# Patient Record
Sex: MALE | Race: WHITE | NOT HISPANIC OR LATINO | Employment: OTHER | URBAN - METROPOLITAN AREA
[De-identification: names, ages, dates, MRNs, and addresses within clinical notes are randomized per-mention and may not be internally consistent; named-entity substitution may affect disease eponyms.]

---

## 2021-05-03 ENCOUNTER — TELEPHONE (OUTPATIENT)
Dept: SCHEDULING | Facility: IMAGING CENTER | Age: 61
End: 2021-05-03

## 2021-05-26 ENCOUNTER — TELEPHONE (OUTPATIENT)
Dept: MEDICAL GROUP | Facility: LAB | Age: 61
End: 2021-05-26

## 2021-05-26 NOTE — TELEPHONE ENCOUNTER
NEW PATIENT VISIT PRE-VISIT PLANNING    1.  Epic[employer] Patient is checked in Patient Demographics?Yes    2.  Immunizations were updated in Epic using Reconcile Outside Information activity? Yes         3.  Is this appointment scheduled as a Hospital Follow-Up? No    4.  Patient is due for the following Health Maintenance Topics:   Health Maintenance Due   Topic Date Due   • HEPATITIS C SCREENING  Never done   • IMM DTaP/Tdap/Td Vaccine (1 - Tdap) Never done   • COLONOSCOPY  Never done   • IMM ZOSTER VACCINES (1 of 2) Never done   5.  Reviewed/Updated the following with patient:       •   Preferred Pharmacy? No       •   Preferred Lab? No       •   Preferred Communication? No       •   Allergies? No       •   Medications? NO  6.  Updated Care Team?       •   DME Company (gait device, O2, CPAP, etc.) NO       •   Other Specialists (eye doctor, derm, GYN, cardiology, endo, etc): NO    7.  AHA (Puls8) form printed for Provider? N/A

## 2021-05-26 NOTE — TELEPHONE ENCOUNTER
Left message for patient to call back regarding pre-visit planning. Please transfer call to [phone]

## 2021-06-18 ENCOUNTER — TELEPHONE (OUTPATIENT)
Dept: MEDICAL GROUP | Facility: LAB | Age: 61
End: 2021-06-18

## 2021-06-18 RX ORDER — AMOXICILLIN 250 MG
CAPSULE ORAL
COMMUNITY
End: 2021-07-26

## 2021-06-18 NOTE — TELEPHONE ENCOUNTER
NEW PATIENT VISIT PRE-VISIT PLANNING    1.  Epic[employer] Patient is checked in Patient Demographics?Yes    2.  Immunizations were updated in Epic using Reconcile Outside Information activity? Yes         3.  Is this appointment scheduled as a Hospital Follow-Up? No    4.  Patient is due for the following Health Maintenance Topics:   Health Maintenance Due   Topic Date Due   • HEPATITIS C SCREENING  Never done   • COLONOSCOPY  Never done   • IMM ZOSTER VACCINES (1 of 2) Never done   • IMM DTaP/Tdap/Td Vaccine (2 - Td or Tdap) 02/02/2021     5.  Reviewed/Updated the following with patient:       •   Preferred Pharmacy? No       •   Preferred Lab? No       •   Preferred Communication? No       •   Allergies? No       •   Medications? NO      6.  Updated Care Team?       •   DME Company (gait device, O2, CPAP, etc.) NO       •   Other Specialists (eye doctor, derm, GYN, cardiology, endo, etc): NO    7.  AHA (Puls8) form printed for Provider? N/A

## 2021-06-18 NOTE — TELEPHONE ENCOUNTER
Left message for patient to call back regarding pre-visit planning. Please transfer call to [phone]

## 2021-06-25 ENCOUNTER — TELEPHONE (OUTPATIENT)
Dept: SCHEDULING | Facility: IMAGING CENTER | Age: 61
End: 2021-06-25

## 2021-07-21 ENCOUNTER — APPOINTMENT (OUTPATIENT)
Dept: RADIOLOGY | Facility: MEDICAL CENTER | Age: 61
End: 2021-07-21
Attending: EMERGENCY MEDICINE
Payer: COMMERCIAL

## 2021-07-21 ENCOUNTER — HOSPITAL ENCOUNTER (EMERGENCY)
Facility: MEDICAL CENTER | Age: 61
End: 2021-07-21
Attending: EMERGENCY MEDICINE
Payer: COMMERCIAL

## 2021-07-21 VITALS
HEIGHT: 72 IN | WEIGHT: 174.16 LBS | SYSTOLIC BLOOD PRESSURE: 133 MMHG | DIASTOLIC BLOOD PRESSURE: 88 MMHG | OXYGEN SATURATION: 100 % | BODY MASS INDEX: 23.59 KG/M2 | RESPIRATION RATE: 16 BRPM | TEMPERATURE: 97.9 F | HEART RATE: 86 BPM

## 2021-07-21 DIAGNOSIS — I82.432 ACUTE DEEP VEIN THROMBOSIS (DVT) OF POPLITEAL VEIN OF LEFT LOWER EXTREMITY (HCC): ICD-10-CM

## 2021-07-21 LAB
ANION GAP SERPL CALC-SCNC: 9 MMOL/L (ref 7–16)
APTT PPP: 32.6 SEC (ref 24.7–36)
BASOPHILS # BLD AUTO: 0.7 % (ref 0–1.8)
BASOPHILS # BLD: 0.04 K/UL (ref 0–0.12)
BUN SERPL-MCNC: 16 MG/DL (ref 8–22)
CALCIUM SERPL-MCNC: 9.3 MG/DL (ref 8.4–10.2)
CHLORIDE SERPL-SCNC: 103 MMOL/L (ref 96–112)
CO2 SERPL-SCNC: 27 MMOL/L (ref 20–33)
CREAT SERPL-MCNC: 0.73 MG/DL (ref 0.5–1.4)
EOSINOPHIL # BLD AUTO: 0.32 K/UL (ref 0–0.51)
EOSINOPHIL NFR BLD: 5.7 % (ref 0–6.9)
ERYTHROCYTE [DISTWIDTH] IN BLOOD BY AUTOMATED COUNT: 43.3 FL (ref 35.9–50)
GLUCOSE SERPL-MCNC: 91 MG/DL (ref 65–99)
HCT VFR BLD AUTO: 46 % (ref 42–52)
HGB BLD-MCNC: 14.9 G/DL (ref 14–18)
IMM GRANULOCYTES # BLD AUTO: 0.01 K/UL (ref 0–0.11)
IMM GRANULOCYTES NFR BLD AUTO: 0.2 % (ref 0–0.9)
INR PPP: 1.07 (ref 0.87–1.13)
LYMPHOCYTES # BLD AUTO: 1.62 K/UL (ref 1–4.8)
LYMPHOCYTES NFR BLD: 29.1 % (ref 22–41)
MCH RBC QN AUTO: 29.9 PG (ref 27–33)
MCHC RBC AUTO-ENTMCNC: 32.4 G/DL (ref 33.7–35.3)
MCV RBC AUTO: 92.4 FL (ref 81.4–97.8)
MONOCYTES # BLD AUTO: 0.38 K/UL (ref 0–0.85)
MONOCYTES NFR BLD AUTO: 6.8 % (ref 0–13.4)
NEUTROPHILS # BLD AUTO: 3.2 K/UL (ref 1.82–7.42)
NEUTROPHILS NFR BLD: 57.5 % (ref 44–72)
NRBC # BLD AUTO: 0 K/UL
NRBC BLD-RTO: 0 /100 WBC
PLATELET # BLD AUTO: 221 K/UL (ref 164–446)
PMV BLD AUTO: 9.2 FL (ref 9–12.9)
POTASSIUM SERPL-SCNC: 3.8 MMOL/L (ref 3.6–5.5)
PROTHROMBIN TIME: 13.1 SEC (ref 12–14.6)
RBC # BLD AUTO: 4.98 M/UL (ref 4.7–6.1)
SODIUM SERPL-SCNC: 139 MMOL/L (ref 135–145)
WBC # BLD AUTO: 5.6 K/UL (ref 4.8–10.8)

## 2021-07-21 PROCEDURE — 85025 COMPLETE CBC W/AUTO DIFF WBC: CPT

## 2021-07-21 PROCEDURE — 85610 PROTHROMBIN TIME: CPT

## 2021-07-21 PROCEDURE — 85730 THROMBOPLASTIN TIME PARTIAL: CPT

## 2021-07-21 PROCEDURE — A9270 NON-COVERED ITEM OR SERVICE: HCPCS | Performed by: EMERGENCY MEDICINE

## 2021-07-21 PROCEDURE — 700102 HCHG RX REV CODE 250 W/ 637 OVERRIDE(OP): Performed by: EMERGENCY MEDICINE

## 2021-07-21 PROCEDURE — 93971 EXTREMITY STUDY: CPT | Mod: LT

## 2021-07-21 PROCEDURE — 99283 EMERGENCY DEPT VISIT LOW MDM: CPT

## 2021-07-21 PROCEDURE — 80048 BASIC METABOLIC PNL TOTAL CA: CPT

## 2021-07-21 RX ADMIN — RIVAROXABAN 15 MG: 15 TABLET, FILM COATED ORAL at 21:36

## 2021-07-22 NOTE — ED TRIAGE NOTES
Chief Complaint   Patient presents with   • Leg Pain     pain and swelling to LLE for 2 days.      /75   Pulse (!) 52   Temp 36.4 °C (97.5 °F) (Temporal)   Resp 20   Ht 1.829 m (6')   Wt 79 kg (174 lb 2.6 oz)   SpO2 95%   BMI 23.62 kg/m²

## 2021-07-22 NOTE — ED NOTES
Patient discharged home in stable condition  AVS provided with recommended follow up and home care instructions and education information  Prescription for Xeralto provided at this time  Patient verbalized understanding  Ambulatory at time of discharge

## 2021-07-22 NOTE — ED PROVIDER NOTES
ED Provider Note    CHIEF COMPLAINT  Chief Complaint   Patient presents with   • Leg Pain     pain and swelling to LLE for 2 days.        HPI  Reilly Swartz is a 61 y.o. male who presents to the emergency department with complaint of leg pain.  The patient states that he started having left lower extremity leg pain and calf pain for the last 2 days.  There is no received alleviating factors, he denies access of sensation or strength of his lower extremity, recent trip and car plane, recent trauma, history of DVT or blood clot.  REVIEW OF SYSTEMS  Positives as above.  Pertinent negatives include loss of sensation or strength lower extremity, chest pain, shortness of breath, discoloration of left lower extremity    PAST MEDICAL HISTORY  History reviewed. No pertinent past medical history.    FAMILY HISTORY  Noncontributory    SOCIAL HISTORY  Social History     Socioeconomic History   • Marital status:      Spouse name: Not on file   • Number of children: Not on file   • Years of education: Not on file   • Highest education level: Not on file   Occupational History   • Not on file   Tobacco Use   • Smoking status: Never Smoker   • Smokeless tobacco: Never Used   Vaping Use   • Vaping Use: Never assessed   Substance and Sexual Activity   • Alcohol use: Not Currently   • Drug use: Never   • Sexual activity: Not on file   Other Topics Concern   • Not on file   Social History Narrative   • Not on file     Social Determinants of Health     Financial Resource Strain:    • Difficulty of Paying Living Expenses:    Food Insecurity:    • Worried About Running Out of Food in the Last Year:    • Ran Out of Food in the Last Year:    Transportation Needs:    • Lack of Transportation (Medical):    • Lack of Transportation (Non-Medical):    Physical Activity:    • Days of Exercise per Week:    • Minutes of Exercise per Session:    Stress:    • Feeling of Stress :    Social Connections:    • Frequency of Communication with  Friends and Family:    • Frequency of Social Gatherings with Friends and Family:    • Attends Jew Services:    • Active Member of Clubs or Organizations:    • Attends Club or Organization Meetings:    • Marital Status:    Intimate Partner Violence:    • Fear of Current or Ex-Partner:    • Emotionally Abused:    • Physically Abused:    • Sexually Abused:        SURGICAL HISTORY  Past Surgical History:   Procedure Laterality Date   • OTHER ABDOMINAL SURGERY      inguinal hernia repair 2008       CURRENT MEDICATIONS  Home Medications    **Home medications have not yet been reviewed for this encounter**         ALLERGIES  No Known Allergies    PHYSICAL EXAM  VITAL SIGNS: /88   Pulse 86   Temp 36.6 °C (97.9 °F) (Temporal)   Resp 16   Ht 1.829 m (6')   Wt 79 kg (174 lb 2.6 oz)   SpO2 100%   BMI 23.62 kg/m²      Constitutional: Well developed, Well nourished, No acute distress, Non-toxic appearance.   Eyes: PERRLA, EOMI, Conjunctiva normal, No discharge.   Skin: Warm, Dry, No erythema, No rash.   Extremities: Edema and tenderness to the posterior calf region and popliteal region, distal pulses are intact  Neurologic: Strength and sensation intact left lower extremity    Results for orders placed or performed during the hospital encounter of 07/21/21   CBC WITH DIFFERENTIAL   Result Value Ref Range    WBC 5.6 4.8 - 10.8 K/uL    RBC 4.98 4.70 - 6.10 M/uL    Hemoglobin 14.9 14.0 - 18.0 g/dL    Hematocrit 46.0 42.0 - 52.0 %    MCV 92.4 81.4 - 97.8 fL    MCH 29.9 27.0 - 33.0 pg    MCHC 32.4 (L) 33.7 - 35.3 g/dL    RDW 43.3 35.9 - 50.0 fL    Platelet Count 221 164 - 446 K/uL    MPV 9.2 9.0 - 12.9 fL    Neutrophils-Polys 57.50 44.00 - 72.00 %    Lymphocytes 29.10 22.00 - 41.00 %    Monocytes 6.80 0.00 - 13.40 %    Eosinophils 5.70 0.00 - 6.90 %    Basophils 0.70 0.00 - 1.80 %    Immature Granulocytes 0.20 0.00 - 0.90 %    Nucleated RBC 0.00 /100 WBC    Neutrophils (Absolute) 3.20 1.82 - 7.42 K/uL    Lymphs  (Absolute) 1.62 1.00 - 4.80 K/uL    Monos (Absolute) 0.38 0.00 - 0.85 K/uL    Eos (Absolute) 0.32 0.00 - 0.51 K/uL    Baso (Absolute) 0.04 0.00 - 0.12 K/uL    Immature Granulocytes (abs) 0.01 0.00 - 0.11 K/uL    NRBC (Absolute) 0.00 K/uL   BMP   Result Value Ref Range    Sodium 139 135 - 145 mmol/L    Potassium 3.8 3.6 - 5.5 mmol/L    Chloride 103 96 - 112 mmol/L    Co2 27 20 - 33 mmol/L    Glucose 91 65 - 99 mg/dL    Bun 16 8 - 22 mg/dL    Creatinine 0.73 0.50 - 1.40 mg/dL    Calcium 9.3 8.4 - 10.2 mg/dL    Anion Gap 9.0 7.0 - 16.0   PT/INR   Result Value Ref Range    PT 13.1 12.0 - 14.6 sec    INR 1.07 0.87 - 1.13   PTT   Result Value Ref Range    APTT 32.6 24.7 - 36.0 sec   ESTIMATED GFR   Result Value Ref Range    GFR If African American >60 >60 mL/min/1.73 m 2    GFR If Non African American >60 >60 mL/min/1.73 m 2         RADIOLOGY/PROCEDURES  US-EXTREMITY VENOUS LOWER UNILAT LEFT   Final Result         1.  Occlusive DVT in the popliteal vein extending distally into the calf veins.   2.  Small collection of free fluid in the soft tissues near the area of interest could represent small seroma, hematoma, or possibly early abscess.      These findings were discussed with the patient's clinician, Enrrique Vang, on 7/21/2021 8:28 PM.          COURSE & MEDICAL DECISION MAKING  Pertinent Labs & Imaging studies reviewed. (See chart for details)  This is a pleasant 61-year-old male presents with swelling of his left lower extremity.  The patient does have a DVT on ultrasound evaluation.  He has no renal insufficiency, no evidence of thrombocytopenia and his coags are normal.  The patient has no evidence of pulmonary visit with no chest pain, shortness of breath, hypoxia tachypnea.  The patient did consent for anticoagulation.  He received Xarelto here in the emergency department, he referral to the anticoagulation clinic and referral to a primary care physician as well.  The patient understands the need to return  to the emergency department for chest pain, shortness of breath or signs of pulmonary embolism.    FINAL IMPRESSION     1. Acute deep vein thrombosis (DVT) of popliteal vein of left lower extremity (HCC)      DISPOSITION:  Patient will be discharged home in stable condition.    FOLLOW UP:  [facility], Emergency Dept  [address]  [place] [id number] [phone]    If symptoms worsen    Eloise Jacob M.D.  [facility]   [address]  [place] [address]  [phone]    Schedule an appointment as soon as possible for a visit         OUTPATIENT MEDICATIONS:  Discharge Medication List as of 7/21/2021  9:32 PM      START taking these medications    Details   rivaroxaban (XARELTO) 15 MG Tab tablet Take 1 tablet by mouth 2 times a day for 21 days., Disp-42 tablet, R-0, Print Rx Paper           Electronically signed by: Enrrique Vang D.O., 7/21/2021 6:45 PM

## 2021-07-26 ENCOUNTER — ANTICOAGULATION VISIT (OUTPATIENT)
Dept: VASCULAR LAB | Facility: MEDICAL CENTER | Age: 61
End: 2021-07-26
Attending: INTERNAL MEDICINE
Payer: COMMERCIAL

## 2021-07-26 VITALS — DIASTOLIC BLOOD PRESSURE: 74 MMHG | SYSTOLIC BLOOD PRESSURE: 109 MMHG | HEART RATE: 59 BPM

## 2021-07-26 DIAGNOSIS — I82.409 DEEP VEIN THROMBOSIS (DVT) OF LOWER EXTREMITY, UNSPECIFIED CHRONICITY, UNSPECIFIED LATERALITY, UNSPECIFIED VEIN (HCC): ICD-10-CM

## 2021-07-26 PROCEDURE — 99213 OFFICE O/P EST LOW 20 MIN: CPT

## 2021-07-26 NOTE — PROGRESS NOTES
NEW DOAC   .  Anticoagulation Summary  As of 7/26/2021    INR goal:     TTR:  --   INR used for dosing:  No new INR was available at the time of this encounter.   Warfarin maintenance plan:  No maintenance plan   Next INR check:  8/10/2021   Target end date:  10/26/2021    Indications    Deep vein thrombosis (HCC) [I82.409]             Anticoagulation Episode Summary     INR check location:      Preferred lab:      Send INR reminders to:      Comments:          Anticoagulation Patient Findings  Patient Findings     Negatives:  Signs/symptoms of thrombosis, Signs/symptoms of bleeding, Laboratory test error suspected, Change in health, Change in alcohol use, Change in activity, Upcoming invasive procedure, Emergency department visit, Upcoming dental procedure, Missed doses, Extra doses, Change in medications, Change in diet/appetite, Hospital admission, Bruising, Other complaints          PCP: No primary care provider on file.  Cardiologist: n/a  Dx: DVT  CHADSVASC = n/a  HAS-BLED = 0  Target End Date = OCT 26, 2021    Pt Hx: Patient presented to the ED for LLE pain and swelling. The patient is a runner and thought maybe it was a strained muscle or injury from that. Because he was having more significant pain and swelling, he went to get checked.   He does report his father and brother may have a genetic predisposition for clotting. He cannot recall the specifics, but will ask his brother who is still alive and inform us at next visit. May need a hypercoagulable workup.     Labs:  Lab Results   Component Value Date/Time    WBC 5.6 07/21/2021 08:30 PM    RBC 4.98 07/21/2021 08:30 PM    HEMOGLOBIN 14.9 07/21/2021 08:30 PM    HEMATOCRIT 46.0 07/21/2021 08:30 PM    MCV 92.4 07/21/2021 08:30 PM    MCH 29.9 07/21/2021 08:30 PM    MCHC 32.4 (L) 07/21/2021 08:30 PM    MPV 9.2 07/21/2021 08:30 PM    NEUTSPOLYS 57.50 07/21/2021 08:30 PM    LYMPHOCYTES 29.10 07/21/2021 08:30 PM    MONOCYTES 6.80 07/21/2021 08:30 PM     EOSINOPHILS 5.70 07/21/2021 08:30 PM    BASOPHILS 0.70 07/21/2021 08:30 PM      Lab Results   Component Value Date/Time    SODIUM 139 07/21/2021 08:30 PM    POTASSIUM 3.8 07/21/2021 08:30 PM    CHLORIDE 103 07/21/2021 08:30 PM    CO2 27 07/21/2021 08:30 PM    GLUCOSE 91 07/21/2021 08:30 PM    BUN 16 07/21/2021 08:30 PM    CREATININE 0.73 07/21/2021 08:30 PM          Pt is new to Xarelto and new to [facility]. Discussed:   · Indication for DOAC therapy.  · Importance of monitoring and compliance.   · Monitoring parameters, signs and symptoms of bleeding or clotting.  · DOAC therapy, side effects, potential DDIs, OTC medications  · DDI   · Pt is NOT on antiplatelet/NSAID therapy   · Lifestyle safety, ie smoking, ETOH, hobby safety, fall safety/prevention  · Procedures for missed doses or suspected missed doses, surgeries/procedures, travel, dental work, any medication changes    Patient started with Xarelto 15mg taken 2 times a day for 21 days on 7/21/21, and will switch to 20mg taken once daily. Pt will transition to 20mg dose on 8/11/21    DOAC affordable = yes    Samples provided: no    Health Status Since Last Assessment   Patient denies any new relevant medical problems, ED visits or hospitalizations   Patient denies any embolic events (stroke/tia/systemic embolism)    Adherence with DOAC Therapy   Pt has denies missed any doses in the average week    Bleeding Risk Assessment     Denies Epistaxis   Pt denies any excessive or unusual bleeding/hematomas.  Pt denies any GI bleeds or hematemesis.  Pt denies any concerning daily headache or sub dural hematoma symptoms.     Pt denies any hematuria   Latest Hemoglobin 14.9   ETOH overuse denies - patient is recovering alcoholic     Creatinine Clearance/Renal Function     Latest ClCr= 57.6 ml/min    Results for SHIN WALTON (MRN [id number]) as of 7/26/2021 12:04   Ref. Range 7/21/2021 20:30   Creatinine Latest Ref Range: 0.50 - 1.40 mg/dL 0.73   GFR If African American  Latest Ref Range: >60 mL/min/1.73 m 2 >60   GFR If Non  Latest Ref Range: >60 mL/min/1.73 m 2 >60     Hepatic function   Latest LFTs    Pt denies any history of liver dysfunction      Drug Interactions   Platelets: 221   ASA/other antiplatelets none   NSAID none   Other drug interactions none   Verified no anticonvulsant or azole therapy, education provided for future use.     Examination   Blood Pressure 109/74   Symptomatic hypotension denies   Significant gait impairment/imbalance/high risk for falls? denies    Final Assessment and Recommendations:   Patient appears stable from the anticoagulation standpoint.     Benefits of continued DOAC therapy outweigh risks for this patient   Recommend pt continue with current DOAC therapy Xarelto 15mg BID     Follow up:   Will follow up with patient 2 weeks  Next appt: Tuesday, Aug 10, 2021   7:30am    Hellen Jolly  PharmD        CC: Dr. Michael Bloch

## 2021-07-28 ENCOUNTER — APPOINTMENT (OUTPATIENT)
Dept: RADIOLOGY | Facility: MEDICAL CENTER | Age: 61
End: 2021-07-28
Attending: EMERGENCY MEDICINE
Payer: COMMERCIAL

## 2021-07-28 ENCOUNTER — HOSPITAL ENCOUNTER (OUTPATIENT)
Facility: MEDICAL CENTER | Age: 61
End: 2021-07-31
Attending: EMERGENCY MEDICINE | Admitting: HOSPITALIST
Payer: COMMERCIAL

## 2021-07-28 ENCOUNTER — OFFICE VISIT (OUTPATIENT)
Dept: MEDICAL GROUP | Facility: LAB | Age: 61
End: 2021-07-28
Payer: COMMERCIAL

## 2021-07-28 VITALS
RESPIRATION RATE: 12 BRPM | HEART RATE: 54 BPM | SYSTOLIC BLOOD PRESSURE: 104 MMHG | BODY MASS INDEX: 23.43 KG/M2 | TEMPERATURE: 97.8 F | WEIGHT: 173 LBS | OXYGEN SATURATION: 96 % | HEIGHT: 72 IN | DIASTOLIC BLOOD PRESSURE: 70 MMHG

## 2021-07-28 DIAGNOSIS — Z12.11 SCREENING FOR COLORECTAL CANCER: ICD-10-CM

## 2021-07-28 DIAGNOSIS — Z12.12 SCREENING FOR COLORECTAL CANCER: ICD-10-CM

## 2021-07-28 DIAGNOSIS — I82.432 ACUTE DEEP VEIN THROMBOSIS (DVT) OF POPLITEAL VEIN OF LEFT LOWER EXTREMITY (HCC): ICD-10-CM

## 2021-07-28 DIAGNOSIS — Z79.01 CHRONIC ANTICOAGULATION: ICD-10-CM

## 2021-07-28 DIAGNOSIS — Z23 NEED FOR VACCINATION: ICD-10-CM

## 2021-07-28 DIAGNOSIS — Z13.6 SCREENING FOR CARDIOVASCULAR CONDITION: ICD-10-CM

## 2021-07-28 DIAGNOSIS — R10.30 LOWER ABDOMINAL PAIN: ICD-10-CM

## 2021-07-28 DIAGNOSIS — S30.1XXA HEMATOMA OF RECTUS SHEATH, INITIAL ENCOUNTER: ICD-10-CM

## 2021-07-28 DIAGNOSIS — Z72.89 OTHER PROBLEMS RELATED TO LIFESTYLE: ICD-10-CM

## 2021-07-28 DIAGNOSIS — M25.641 FINGER STIFFNESS, RIGHT: ICD-10-CM

## 2021-07-28 LAB
ALBUMIN SERPL BCP-MCNC: 4 G/DL (ref 3.2–4.9)
ALBUMIN/GLOB SERPL: 1.7 G/DL
ALP SERPL-CCNC: 68 U/L (ref 30–99)
ALT SERPL-CCNC: 22 U/L (ref 2–50)
ANION GAP SERPL CALC-SCNC: 9 MMOL/L (ref 7–16)
APPEARANCE UR: CLEAR
AST SERPL-CCNC: 21 U/L (ref 12–45)
BASOPHILS # BLD AUTO: 0.4 % (ref 0–1.8)
BASOPHILS # BLD: 0.04 K/UL (ref 0–0.12)
BILIRUB SERPL-MCNC: 0.4 MG/DL (ref 0.1–1.5)
BILIRUB UR QL STRIP.AUTO: NEGATIVE
BUN SERPL-MCNC: 17 MG/DL (ref 8–22)
CALCIUM SERPL-MCNC: 9 MG/DL (ref 8.4–10.2)
CHLORIDE SERPL-SCNC: 104 MMOL/L (ref 96–112)
CO2 SERPL-SCNC: 26 MMOL/L (ref 20–33)
COLOR UR: YELLOW
CREAT SERPL-MCNC: 0.82 MG/DL (ref 0.5–1.4)
EOSINOPHIL # BLD AUTO: 0.22 K/UL (ref 0–0.51)
EOSINOPHIL NFR BLD: 2.3 % (ref 0–6.9)
ERYTHROCYTE [DISTWIDTH] IN BLOOD BY AUTOMATED COUNT: 42.7 FL (ref 35.9–50)
GLOBULIN SER CALC-MCNC: 2.4 G/DL (ref 1.9–3.5)
GLUCOSE SERPL-MCNC: 145 MG/DL (ref 65–99)
GLUCOSE UR STRIP.AUTO-MCNC: NEGATIVE MG/DL
HCT VFR BLD AUTO: 40.3 % (ref 42–52)
HGB BLD-MCNC: 13.5 G/DL (ref 14–18)
IMM GRANULOCYTES # BLD AUTO: 0.05 K/UL (ref 0–0.11)
IMM GRANULOCYTES NFR BLD AUTO: 0.5 % (ref 0–0.9)
KETONES UR STRIP.AUTO-MCNC: NEGATIVE MG/DL
LEUKOCYTE ESTERASE UR QL STRIP.AUTO: NEGATIVE
LIPASE SERPL-CCNC: 27 U/L (ref 7–58)
LYMPHOCYTES # BLD AUTO: 0.98 K/UL (ref 1–4.8)
LYMPHOCYTES NFR BLD: 10 % (ref 22–41)
MCH RBC QN AUTO: 30.3 PG (ref 27–33)
MCHC RBC AUTO-ENTMCNC: 33.5 G/DL (ref 33.7–35.3)
MCV RBC AUTO: 90.4 FL (ref 81.4–97.8)
MICRO URNS: NORMAL
MONOCYTES # BLD AUTO: 0.67 K/UL (ref 0–0.85)
MONOCYTES NFR BLD AUTO: 6.9 % (ref 0–13.4)
NEUTROPHILS # BLD AUTO: 7.81 K/UL (ref 1.82–7.42)
NEUTROPHILS NFR BLD: 79.9 % (ref 44–72)
NITRITE UR QL STRIP.AUTO: NEGATIVE
NRBC # BLD AUTO: 0 K/UL
NRBC BLD-RTO: 0 /100 WBC
PH UR STRIP.AUTO: 7 [PH] (ref 5–8)
PLATELET # BLD AUTO: 276 K/UL (ref 164–446)
PMV BLD AUTO: 8.9 FL (ref 9–12.9)
POTASSIUM SERPL-SCNC: 3.7 MMOL/L (ref 3.6–5.5)
PROT SERPL-MCNC: 6.4 G/DL (ref 6–8.2)
PROT UR QL STRIP: NEGATIVE MG/DL
RBC # BLD AUTO: 4.46 M/UL (ref 4.7–6.1)
RBC UR QL AUTO: NEGATIVE
SODIUM SERPL-SCNC: 139 MMOL/L (ref 135–145)
SP GR UR STRIP.AUTO: 1.01
WBC # BLD AUTO: 9.8 K/UL (ref 4.8–10.8)

## 2021-07-28 PROCEDURE — 74177 CT ABD & PELVIS W/CONTRAST: CPT

## 2021-07-28 PROCEDURE — 90715 TDAP VACCINE 7 YRS/> IM: CPT | Performed by: FAMILY MEDICINE

## 2021-07-28 PROCEDURE — 85025 COMPLETE CBC W/AUTO DIFF WBC: CPT

## 2021-07-28 PROCEDURE — 700117 HCHG RX CONTRAST REV CODE 255: Performed by: EMERGENCY MEDICINE

## 2021-07-28 PROCEDURE — 99204 OFFICE O/P NEW MOD 45 MIN: CPT | Mod: 25 | Performed by: FAMILY MEDICINE

## 2021-07-28 PROCEDURE — 99285 EMERGENCY DEPT VISIT HI MDM: CPT

## 2021-07-28 PROCEDURE — 90472 IMMUNIZATION ADMIN EACH ADD: CPT | Performed by: FAMILY MEDICINE

## 2021-07-28 PROCEDURE — 90750 HZV VACC RECOMBINANT IM: CPT | Performed by: FAMILY MEDICINE

## 2021-07-28 PROCEDURE — 90471 IMMUNIZATION ADMIN: CPT | Performed by: FAMILY MEDICINE

## 2021-07-28 PROCEDURE — 85730 THROMBOPLASTIN TIME PARTIAL: CPT

## 2021-07-28 PROCEDURE — 80053 COMPREHEN METABOLIC PANEL: CPT

## 2021-07-28 PROCEDURE — 81003 URINALYSIS AUTO W/O SCOPE: CPT

## 2021-07-28 PROCEDURE — 83690 ASSAY OF LIPASE: CPT

## 2021-07-28 PROCEDURE — 85610 PROTHROMBIN TIME: CPT

## 2021-07-28 RX ADMIN — IOHEXOL 100 ML: 350 INJECTION, SOLUTION INTRAVENOUS at 23:47

## 2021-07-28 ASSESSMENT — ENCOUNTER SYMPTOMS
DIARRHEA: 0
NAUSEA: 0
FEVER: 0
VOMITING: 0
CHILLS: 0
SHORTNESS OF BREATH: 0

## 2021-07-28 ASSESSMENT — LIFESTYLE VARIABLES
ON A TYPICAL DAY WHEN YOU DRINK ALCOHOL HOW MANY DRINKS DO YOU HAVE: 0
DO YOU DRINK ALCOHOL: NO
EVER HAD A DRINK FIRST THING IN THE MORNING TO STEADY YOUR NERVES TO GET RID OF A HANGOVER: NO
CONSUMPTION TOTAL: NEGATIVE
TOTAL SCORE: 0
HOW MANY TIMES IN THE PAST YEAR HAVE YOU HAD 5 OR MORE DRINKS IN A DAY: 0
HAVE PEOPLE ANNOYED YOU BY CRITICIZING YOUR DRINKING: NO
TOTAL SCORE: 0
AVERAGE NUMBER OF DAYS PER WEEK YOU HAVE A DRINK CONTAINING ALCOHOL: 0
HAVE YOU EVER FELT YOU SHOULD CUT DOWN ON YOUR DRINKING: NO
EVER FELT BAD OR GUILTY ABOUT YOUR DRINKING: NO
TOTAL SCORE: 0

## 2021-07-28 ASSESSMENT — PATIENT HEALTH QUESTIONNAIRE - PHQ9: CLINICAL INTERPRETATION OF PHQ2 SCORE: 0

## 2021-07-28 NOTE — PROGRESS NOTES
Reilly Swartz is a 61 y.o. male here to establish care and discuss DVT and finger stiffness.    HPI:      DVT  Left popliteal DVT on ER visit 1 week ago.  He was started on Xarelto.  No obvious provoking factor.  He does have appointment with vascular medicine next month.  He does report a possible hereditary hypercoagulability in his dad and brother.  No prior incidence of DVT.  Swelling has improved, no chest pain or shortness of breath.    Fingers  Limited flexion or stiffness in thumbs and other fingers over last few months.  Worst in right thumb.  He notices when using phone or keyboard.  He is stiff all day, necessarily worse in the morning.  No pain or swelling.  No injury.      Recovering alcoholic - does AA  Current medicines (including changes today)  Current Outpatient Medications   Medication Sig Dispense Refill   • rivaroxaban (XARELTO) 15 MG Tab tablet Take 1 tablet by mouth 2 times a day for 21 days. 42 tablet 0     No current facility-administered medications for this visit.     He  has no past medical history on file.  He  has a past surgical history that includes other abdominal surgery.  Social History     Tobacco Use   • Smoking status: Never Smoker   • Smokeless tobacco: Never Used   Vaping Use   • Vaping Use: Never used   Substance Use Topics   • Alcohol use: Not Currently   • Drug use: Never     Social History     Social History Narrative   • Not on file     History reviewed. No pertinent family history.  No family status information on file.       ROS  Review of Systems   Constitutional: Negative for chills and fever.   Respiratory: Negative for shortness of breath.    Cardiovascular: Negative for chest pain.   Gastrointestinal: Negative for diarrhea, nausea and vomiting.         Objective:     Physical Exam:  /70 (BP Location: Right arm, Patient Position: Sitting, BP Cuff Size: Adult)   Pulse (!) 54   Temp 36.6 °C (97.8 °F)   Resp 12   Ht 1.829 m (6')   Wt 78.5 kg (173 lb)    SpO2 96%  Body mass index is 23.46 kg/m².  Constitutional: Alert. Well appearing. No distress.  Skin: Warm, dry, good turgor, no visible rashes.  Eye: Equal, round and reactive to light, conjunctiva clear, lids normal.  Neck: Trachea midline, no masses, no thyromegaly. No cervical or supraclavicular lymphadenopathy.  Respiratory: Normal effort. Lungs are clear to auscultation bilaterally.  Cardiovascular: Regular rate and rhythm. Normal S1/S2. No murmurs, rubs or gallops.   Ext: No lower extremity edema, tenderness, or erythema.  MSK: Bony swelling to the IP joint of the right thumb.  No tenderness or synovitis noted.  Range of motion is intact all the fingers of both hands.  Neuro: Moves all four extremities. No facial droop.  Psych: Answers questions appropriately. Normal affect and mood.    Assessment and Plan:     1. Acute deep vein thrombosis (DVT) of popliteal vein of left lower extremity (HCC)  Diagnosed at ER visit 1 week ago.  No prior DVT, appears unprovoked.  He may have family history of hereditary hypercoagulability.  Symptoms improving.  Has follow-up with vascular medicine clinic next month.  Continue Xarelto.    2. Finger stiffness, right  Stiffness to right thumb with bony swelling.  Likely OA, x-rays ordered.  - DX-FINGER(S) 2+ RIGHT; Future    3. Other problems related to lifestyle  - HEP C VIRUS ANTIBODY; Future    4. Screening for cardiovascular condition  - Lipid Profile; Future    5. Need for vaccination  - Tdap Vaccine =>8YO IM  - Shingles Vaccine (Shingrix)    6. Screening for colorectal cancer  No prior colonoscopy.  - REFERRAL TO GI FOR COLONOSCOPY    Records requested from previous PCP.    Follow up: Return in about 3 months (around 10/28/2021).         PLEASE NOTE: This note was created using voice recognition software.

## 2021-07-29 ENCOUNTER — TELEPHONE (OUTPATIENT)
Dept: MEDICAL GROUP | Facility: LAB | Age: 61
End: 2021-07-29

## 2021-07-29 PROBLEM — Z79.01 CHRONIC ANTICOAGULATION: Status: ACTIVE | Noted: 2021-07-29

## 2021-07-29 PROBLEM — R73.09 ELEVATED GLUCOSE: Status: ACTIVE | Noted: 2021-07-29

## 2021-07-29 PROBLEM — S30.1XXA RECTUS SHEATH HEMATOMA: Status: ACTIVE | Noted: 2021-07-29

## 2021-07-29 LAB
APTT PPP: 42.5 SEC (ref 24.7–36)
EST. AVERAGE GLUCOSE BLD GHB EST-MCNC: 108 MG/DL
HBA1C MFR BLD: 5.4 % (ref 4–5.6)
HCT VFR BLD AUTO: 37.9 % (ref 42–52)
HCT VFR BLD AUTO: 38.6 % (ref 42–52)
HCT VFR BLD AUTO: 42.1 % (ref 42–52)
HGB BLD-MCNC: 12.8 G/DL (ref 14–18)
HGB BLD-MCNC: 13 G/DL (ref 14–18)
HGB BLD-MCNC: 13.7 G/DL (ref 14–18)
INR PPP: 1.92 (ref 0.87–1.13)
PROTHROMBIN TIME: 20.6 SEC (ref 12–14.6)

## 2021-07-29 PROCEDURE — 85018 HEMOGLOBIN: CPT | Mod: 91

## 2021-07-29 PROCEDURE — A9270 NON-COVERED ITEM OR SERVICE: HCPCS | Performed by: HOSPITALIST

## 2021-07-29 PROCEDURE — G0378 HOSPITAL OBSERVATION PER HR: HCPCS

## 2021-07-29 PROCEDURE — 36415 COLL VENOUS BLD VENIPUNCTURE: CPT

## 2021-07-29 PROCEDURE — 94760 N-INVAS EAR/PLS OXIMETRY 1: CPT

## 2021-07-29 PROCEDURE — 700102 HCHG RX REV CODE 250 W/ 637 OVERRIDE(OP): Performed by: HOSPITALIST

## 2021-07-29 PROCEDURE — 99220 PR INITIAL OBSERVATION CARE,LEVL III: CPT | Performed by: HOSPITALIST

## 2021-07-29 PROCEDURE — 83036 HEMOGLOBIN GLYCOSYLATED A1C: CPT

## 2021-07-29 PROCEDURE — 85014 HEMATOCRIT: CPT

## 2021-07-29 RX ORDER — BISACODYL 10 MG
10 SUPPOSITORY, RECTAL RECTAL
Status: DISCONTINUED | OUTPATIENT
Start: 2021-07-29 | End: 2021-07-31 | Stop reason: HOSPADM

## 2021-07-29 RX ORDER — POLYETHYLENE GLYCOL 3350 17 G/17G
1 POWDER, FOR SOLUTION ORAL
Status: DISCONTINUED | OUTPATIENT
Start: 2021-07-29 | End: 2021-07-31 | Stop reason: HOSPADM

## 2021-07-29 RX ORDER — PROMETHAZINE HYDROCHLORIDE 25 MG/1
12.5-25 SUPPOSITORY RECTAL EVERY 4 HOURS PRN
Status: DISCONTINUED | OUTPATIENT
Start: 2021-07-29 | End: 2021-07-31 | Stop reason: HOSPADM

## 2021-07-29 RX ORDER — AMOXICILLIN 250 MG
2 CAPSULE ORAL 2 TIMES DAILY
Status: DISCONTINUED | OUTPATIENT
Start: 2021-07-29 | End: 2021-07-31 | Stop reason: HOSPADM

## 2021-07-29 RX ORDER — ACETAMINOPHEN 325 MG/1
650 TABLET ORAL EVERY 6 HOURS PRN
Status: DISCONTINUED | OUTPATIENT
Start: 2021-07-29 | End: 2021-07-31 | Stop reason: HOSPADM

## 2021-07-29 RX ORDER — ONDANSETRON 4 MG/1
4 TABLET, ORALLY DISINTEGRATING ORAL EVERY 4 HOURS PRN
Status: DISCONTINUED | OUTPATIENT
Start: 2021-07-29 | End: 2021-07-31 | Stop reason: HOSPADM

## 2021-07-29 RX ORDER — PROMETHAZINE HYDROCHLORIDE 25 MG/1
12.5-25 TABLET ORAL EVERY 4 HOURS PRN
Status: DISCONTINUED | OUTPATIENT
Start: 2021-07-29 | End: 2021-07-31 | Stop reason: HOSPADM

## 2021-07-29 RX ORDER — ONDANSETRON 2 MG/ML
4 INJECTION INTRAMUSCULAR; INTRAVENOUS EVERY 4 HOURS PRN
Status: DISCONTINUED | OUTPATIENT
Start: 2021-07-29 | End: 2021-07-31 | Stop reason: HOSPADM

## 2021-07-29 RX ORDER — TRAMADOL HYDROCHLORIDE 50 MG/1
50 TABLET ORAL EVERY 6 HOURS PRN
Status: DISCONTINUED | OUTPATIENT
Start: 2021-07-29 | End: 2021-07-29

## 2021-07-29 RX ORDER — PROCHLORPERAZINE EDISYLATE 5 MG/ML
5-10 INJECTION INTRAMUSCULAR; INTRAVENOUS EVERY 4 HOURS PRN
Status: DISCONTINUED | OUTPATIENT
Start: 2021-07-29 | End: 2021-07-31 | Stop reason: HOSPADM

## 2021-07-29 RX ORDER — OXYCODONE HYDROCHLORIDE 5 MG/1
5 TABLET ORAL EVERY 6 HOURS PRN
Status: DISCONTINUED | OUTPATIENT
Start: 2021-07-29 | End: 2021-07-29

## 2021-07-29 RX ORDER — ACETAMINOPHEN 325 MG/1
650 TABLET ORAL EVERY 6 HOURS PRN
Status: DISCONTINUED | OUTPATIENT
Start: 2021-07-29 | End: 2021-07-29

## 2021-07-29 RX ADMIN — DOCUSATE SODIUM 50 MG AND SENNOSIDES 8.6 MG 2 TABLET: 8.6; 5 TABLET, FILM COATED ORAL at 05:53

## 2021-07-29 RX ADMIN — DOCUSATE SODIUM 50 MG AND SENNOSIDES 8.6 MG 2 TABLET: 8.6; 5 TABLET, FILM COATED ORAL at 17:40

## 2021-07-29 ASSESSMENT — COGNITIVE AND FUNCTIONAL STATUS - GENERAL
DAILY ACTIVITIY SCORE: 24
SUGGESTED CMS G CODE MODIFIER DAILY ACTIVITY: CH
SUGGESTED CMS G CODE MODIFIER MOBILITY: CH
DAILY ACTIVITIY SCORE: 24
MOBILITY SCORE: 24
SUGGESTED CMS G CODE MODIFIER DAILY ACTIVITY: CH

## 2021-07-29 ASSESSMENT — ENCOUNTER SYMPTOMS
FOCAL WEAKNESS: 0
SHORTNESS OF BREATH: 0
INSOMNIA: 0
NAUSEA: 0
ABDOMINAL PAIN: 1
WEAKNESS: 0
DOUBLE VISION: 0
BLOOD IN STOOL: 0
DIZZINESS: 0
COUGH: 0
DIARRHEA: 0
SORE THROAT: 0
BLURRED VISION: 0
BRUISES/BLEEDS EASILY: 0
MYALGIAS: 0
CHILLS: 0
DEPRESSION: 0
VOMITING: 0
ABDOMINAL PAIN: 0
BACK PAIN: 0
HALLUCINATIONS: 0
HEARTBURN: 0
NECK PAIN: 0
FEVER: 0
HEADACHES: 0
PALPITATIONS: 0

## 2021-07-29 ASSESSMENT — LIFESTYLE VARIABLES
AVERAGE NUMBER OF DAYS PER WEEK YOU HAVE A DRINK CONTAINING ALCOHOL: 0
ON A TYPICAL DAY WHEN YOU DRINK ALCOHOL HOW MANY DRINKS DO YOU HAVE: 0
CONSUMPTION TOTAL: NEGATIVE
EVER HAD A DRINK FIRST THING IN THE MORNING TO STEADY YOUR NERVES TO GET RID OF A HANGOVER: NO
TOTAL SCORE: 0
HAVE PEOPLE ANNOYED YOU BY CRITICIZING YOUR DRINKING: NO
HOW MANY TIMES IN THE PAST YEAR HAVE YOU HAD 5 OR MORE DRINKS IN A DAY: 0
ALCOHOL_USE: NO
TOTAL SCORE: 0
HAVE YOU EVER FELT YOU SHOULD CUT DOWN ON YOUR DRINKING: NO
TOTAL SCORE: 0
EVER FELT BAD OR GUILTY ABOUT YOUR DRINKING: NO

## 2021-07-29 ASSESSMENT — FIBROSIS 4 INDEX: FIB4 SCORE: 0.99

## 2021-07-29 ASSESSMENT — PAIN DESCRIPTION - PAIN TYPE
TYPE: ACUTE PAIN

## 2021-07-29 ASSESSMENT — PATIENT HEALTH QUESTIONNAIRE - PHQ9
1. LITTLE INTEREST OR PLEASURE IN DOING THINGS: NOT AT ALL
SUM OF ALL RESPONSES TO PHQ9 QUESTIONS 1 AND 2: 0
2. FEELING DOWN, DEPRESSED, IRRITABLE, OR HOPELESS: NOT AT ALL

## 2021-07-29 NOTE — ED TRIAGE NOTES
Pt comes in c/o abdomen pain that started about 8 hours ago  Saw PCP today and received immunizations  Pain not going aware Sharp pain  Recent Dx w/ L calk DVT last week and started on Xarelto   No N/V  Thinks may be constipated  Denies CP

## 2021-07-29 NOTE — ED NOTES
Pt to be admitted for further evaluation and treatment. Pt and pt wife accepting of this plan. Report given to Mamie LUZ

## 2021-07-29 NOTE — TELEPHONE ENCOUNTER
Patient currently admitted. Will follow up to make sure he has an appointment with PCP once discharged.   SUKH Morin

## 2021-07-29 NOTE — CARE PLAN
The patient is Stable - Low risk of patient condition declining or worsening    Shift Goals  Clinical Goals: pain control, monitor hemoglobin  Patient Goals: rest, continue pressure in that area    Progress made toward(s) clinical / shift goals:  Latest hemoglobin is 13.0, trending up from 12.8. Pt denying the need for pain medication.    Patient is not progressing towards the following goals: N/A    Problem: Pain - Standard  Goal: Alleviation of pain or a reduction in pain to the patient’s comfort goal  Outcome: Progressing     Problem: Knowledge Deficit - Standard  Goal: Patient and family/care givers will demonstrate understanding of plan of care, disease process/condition, diagnostic tests and medications  Outcome: Progressing     Problem: Gastrointestinal Irritability  Goal: Nausea and vomiting will be absent or improve  Outcome: Progressing   Absence of symptoms

## 2021-07-29 NOTE — PROGRESS NOTES
Report received from ED RN. Pt awake and alert. Pt arrived to unit via wheelchair. Pt able to ambulate without assistance to bed. Pt tolerated well.

## 2021-07-29 NOTE — ASSESSMENT & PLAN NOTE
-Has been on Xarelto for popliteal DVT for 1 week  -Rectus sheath hematoma is stable  -Resume xarelto and monitor

## 2021-07-29 NOTE — CARE PLAN
The patient is Stable - Low risk of patient condition declining or worsening    Shift Goals  Clinical Goals: pain control   Patient Goals: Rest    Progress made toward(s) clinical / shift goals:    Problem: Pain - Standard  Goal: Alleviation of pain or a reduction in pain to the patient’s comfort goal  Outcome: Progressing  Note: Pt currently denying pain or pain medications     Problem: Gastrointestinal Irritability  Goal: Nausea and vomiting will be absent or improve  Outcome: Progressing  Note: Pt currently denies N/V       Patient is not progressing towards the following goals:

## 2021-07-29 NOTE — HOSPITAL COURSE
History of occlusive left popliteal DVT diagnosed 1 week ago, started on Xarelto admitted for sudden onset abdominal discomfort after doing crunches found to have a rectus sheath hematoma.  General surgery was consulted and Xarelto was held.

## 2021-07-29 NOTE — CONSULTS
CONSULT NOTE    PATIENT ID  Name:             Reilly Swartz   YOB: 1960  Age:                 61 y.o.  male   MRN:               [id number]    CHIEF COMPLAINT:        Abdominal pain    HISTORY OF PRESENT ILLNESS:  61-year-old male who was started on Xarelto last week for DVT, presented today with acute onset left-sided abdominal pain.  This started after doing push-ups.  The patient presented to the emergency department and he was noted to have a rectus sheath hematoma with a small amount of extravasation.  The wound has been wrapped tightly to keep pressure to decrease bleeding.  His hemoglobin has dropped slightly since admission.  He feels well otherwise.  He last took his Xarelto last night.    REVIEW OF SYSTEMS:   Constitutional: Denies unintended weight change, night sweats, fatigue  Eyes:   Denies eye pain, redness, discharge, vision changes  Ears/Nose/Throat/Mouth: Denies hearing changes, ear pain, nasal congestion, sore throat, dysphagia  Cardiovascular:  Denies Chest pain, shortness of breath, orthopnea, palpitations, claudication  Respiratory:  Denies cough, sputum, wheezing, dyspnea  Gastrointestinal/Hepatic:  Denies dysphagia, melena, jaundice, hematochezia, changing heartburn  Genitourinary:  Denies dysuria, increased frequency, hematuria, urgency  Musculoskeletal/Rheum: Denies changing arthralgias, myalgias, joint swelling, joint stiffness  Skin/Breast: Denies changing skin lesions, pruritis, nipple discharge, hair changes  Neurological: Denies weakness, numbness, paresthesia, syncope, dizziness  Pyschiatric: Denies acute depression, anxiety, insomnia, personality changes, delusions  Endocrine: Denies temperature intolerance, polydipsia, polyuria  Heme/Oncology/Lymph Nodes: Denies easy bruising, bleeding, lymphadenopathy  All other systems were reviewed and are negative                 Past Medical History:   History reviewed. No pertinent past medical history.    Past  Surgical History:  Past Surgical History:   Procedure Laterality Date   • OTHER ABDOMINAL SURGERY      inguinal hernia repair 2008       Current Outpatient Medications:  No current facility-administered medications on file prior to encounter.     Current Outpatient Medications on File Prior to Encounter   Medication Sig Dispense Refill   • rivaroxaban (XARELTO) 15 MG Tab tablet Take 1 tablet by mouth 2 times a day for 21 days. 42 tablet 0       Current Inpatient Medications:   Current Facility-Administered Medications   Medication Last Admin   • acetaminophen (Tylenol) tablet 650 mg     • ondansetron (ZOFRAN) syringe/vial injection 4 mg     • ondansetron (ZOFRAN ODT) dispertab 4 mg     • promethazine (PHENERGAN) tablet 12.5-25 mg     • promethazine (PHENERGAN) suppository 12.5-25 mg     • prochlorperazine (COMPAZINE) injection 5-10 mg     • senna-docusate (PERICOLACE or SENOKOT S) 8.6-50 MG per tablet 2 tablet 2 tablet at 07/29/21 0553    And   • polyethylene glycol/lytes (MIRALAX) PACKET 1 Packet      And   • magnesium hydroxide (MILK OF MAGNESIA) suspension 30 mL      And   • bisacodyl (DULCOLAX) suppository 10 mg     • oxyCODONE immediate-release (ROXICODONE) tablet 5 mg     • traMADol (ULTRAM) 50 MG tablet 50 mg         Medication Allergy/Sensitivities:  No Known Allergies    Family History:  History reviewed. No pertinent family history.    Social History:  PCP: Nir Hidalgo M.D.  Social History     Tobacco Use   Smoking Status Never Smoker   Smokeless Tobacco Never Used     Social History     Substance and Sexual Activity   Alcohol Use Not Currently     Social History     Substance and Sexual Activity   Drug Use Never       PHYSICAL EXAM:  Weight/BMI: Body mass index is 23.62 kg/m².  Vitals:    07/29/21 0119 07/29/21 0204 07/29/21 0226 07/29/21 0231   BP:  (!) 95/56 115/68    Pulse: (!) 58 72 61    Resp:   18    Temp:   36.9 °C (98.4 °F)    TempSrc:   Temporal    SpO2: 97% 97% 95%    Weight:    79  kg (174 lb 2.6 oz)   Height:         Oxygen Therapy:  Pulse Oximetry: 95 %, O2 (LPM): 0, O2 Delivery Device: None - Room Air    Constitutional: Well developed, Well nourished, No acute distress, Non-toxic appearance.    HENMT: Normocephalic, Atraumatic, Bilateral external ears normal, Oropharynx moist mucous membranes, No oral exudates, Nose normal.  No thyromegaly.   Eyes: PERRLA, EOMI, Conjunctiva normal, No discharge.   Neck: Normal range of motion, No cervical tenderness, Supple, No stridor, no JVD.  Cardiovascular: Normal heart rate, Normal rhythm, No murmurs, No rubs, No gallops.   Extremites with intact distal pulses, no cyanosis, clubbing or edema.   Lungs:  Respiratory effort is normal. Normal breath sounds, breath sounds clear to auscultation bilaterally,  no rales, no rhonchi, no wheezing.   Abdomen: Bowel sounds normal, Soft, tenderness overlying left mid rectus muscle, otherwise nontender, no distention, No guarding, No rebound, No masses, No hepatosplenomegaly.  Skin: Warm, Dry, No erythema, No rash, no induration or crepitus.        Neurologic: Alert & oriented x 3, Normal motor function, Normal sensory function, No focal deficits noted, cranial nerves II through XII are normal.  Psychiatric: Affect normal, Judgment normal, Mood normal.     LAB DATA REVIEWED:  Recent Results (from the past 24 hour(s))   URINALYSIS    Collection Time: 07/28/21 10:10 PM    Specimen: Urine   Result Value Ref Range    Color Yellow     Character Clear     Specific Gravity 1.015 <1.035    Ph 7.0 5.0 - 8.0    Glucose Negative Negative mg/dL    Ketones Negative Negative mg/dL    Protein Negative Negative mg/dL    Bilirubin Negative Negative    Nitrite Negative Negative    Leukocyte Esterase Negative Negative    Occult Blood Negative Negative    Micro Urine Req see below    CBC WITH DIFFERENTIAL    Collection Time: 07/28/21 10:17 PM   Result Value Ref Range    WBC 9.8 4.8 - 10.8 K/uL    RBC 4.46 (L) 4.70 - 6.10 M/uL     Hemoglobin 13.5 (L) 14.0 - 18.0 g/dL    Hematocrit 40.3 (L) 42.0 - 52.0 %    MCV 90.4 81.4 - 97.8 fL    MCH 30.3 27.0 - 33.0 pg    MCHC 33.5 (L) 33.7 - 35.3 g/dL    RDW 42.7 35.9 - 50.0 fL    Platelet Count 276 164 - 446 K/uL    MPV 8.9 (L) 9.0 - 12.9 fL    Neutrophils-Polys 79.90 (H) 44.00 - 72.00 %    Lymphocytes 10.00 (L) 22.00 - 41.00 %    Monocytes 6.90 0.00 - 13.40 %    Eosinophils 2.30 0.00 - 6.90 %    Basophils 0.40 0.00 - 1.80 %    Immature Granulocytes 0.50 0.00 - 0.90 %    Nucleated RBC 0.00 /100 WBC    Neutrophils (Absolute) 7.81 (H) 1.82 - 7.42 K/uL    Lymphs (Absolute) 0.98 (L) 1.00 - 4.80 K/uL    Monos (Absolute) 0.67 0.00 - 0.85 K/uL    Eos (Absolute) 0.22 0.00 - 0.51 K/uL    Baso (Absolute) 0.04 0.00 - 0.12 K/uL    Immature Granulocytes (abs) 0.05 0.00 - 0.11 K/uL    NRBC (Absolute) 0.00 K/uL   COMP METABOLIC PANEL    Collection Time: 07/28/21 10:17 PM   Result Value Ref Range    Sodium 139 135 - 145 mmol/L    Potassium 3.7 3.6 - 5.5 mmol/L    Chloride 104 96 - 112 mmol/L    Co2 26 20 - 33 mmol/L    Anion Gap 9.0 7.0 - 16.0    Glucose 145 (H) 65 - 99 mg/dL    Bun 17 8 - 22 mg/dL    Creatinine 0.82 0.50 - 1.40 mg/dL    Calcium 9.0 8.4 - 10.2 mg/dL    AST(SGOT) 21 12 - 45 U/L    ALT(SGPT) 22 2 - 50 U/L    Alkaline Phosphatase 68 30 - 99 U/L    Total Bilirubin 0.4 0.1 - 1.5 mg/dL    Albumin 4.0 3.2 - 4.9 g/dL    Total Protein 6.4 6.0 - 8.2 g/dL    Globulin 2.4 1.9 - 3.5 g/dL    A-G Ratio 1.7 g/dL   LIPASE    Collection Time: 07/28/21 10:17 PM   Result Value Ref Range    Lipase 27 7 - 58 U/L   ESTIMATED GFR    Collection Time: 07/28/21 10:17 PM   Result Value Ref Range    GFR If African American >60 >60 mL/min/1.73 m 2    GFR If Non African American >60 >60 mL/min/1.73 m 2   APTT    Collection Time: 07/28/21 10:17 PM   Result Value Ref Range    APTT 42.5 (H) 24.7 - 36.0 sec   PROTHROMBIN TIME (INR)    Collection Time: 07/28/21 10:17 PM   Result Value Ref Range    PT 20.6 (H) 12.0 - 14.6 sec    INR 1.92  (H) 0.87 - 1.13   HEMOGLOBIN AND HEMATOCRIT    Collection Time: 07/29/21  4:55 AM   Result Value Ref Range    Hemoglobin 12.8 (L) 14.0 - 18.0 g/dL    Hematocrit 37.9 (L) 42.0 - 52.0 %       IMAGING:   Images Independently Reviewed   CT-ABDOMEN-PELVIS WITH   Final Result      1.  Left inferior rectus hematoma with tiny focus of active contrast extravasation.   2.  Asymmetric enlargement of the left external iliac vein. Cannot exclude DVT. Ultrasound could be performed to further evaluate.   These findings were discussed with BRIAN LOVE on 7/29/2021 12:08 AM.             ASSESSMENT/PLAN     61-year-old male who was recently anticoagulated with Xarelto for DVT, presenting with left rectus sheath hematoma.  He has had a slight drop in his hemoglobin however remained stable and does not appear to have expanding hematoma.    Continue with conservative management with pressure dressing.  If the patient continues to have drop in hemoglobin then consider reversal of Xarelto and possible intervention by interventional radiology for embolization of the epigastric vessels.  Surgical management would be a last resort for this issue.    Mini Brady MD  General Surgery  Colon and Rectal Surgery  [facility]

## 2021-07-29 NOTE — PROGRESS NOTES
Assumed report and patient care from Mamie LUZ via bedside report. Patient is resting comfortably in bed with no signs of labored breathing or restlessness. POC discussed. No questions or concerns at this time. Safety measures in place, call light within reach. Hemoglobin results reviewed and noted.

## 2021-07-29 NOTE — ASSESSMENT & PLAN NOTE
-Occlusive DVT in the popliteal vein diagnosed on 7/21/2021 and started on Xarelto for this.  -FMHx of antiphospholipid antibody syndrome in brother and half sister  -Check anticardiolipin antibody panel - if positive - will need lifelong anticoagulation  -Resume xarelto

## 2021-07-29 NOTE — ASSESSMENT & PLAN NOTE
-Recently started on Xarelto for DVT, presented with abdominal pain after doing crunches  -CT abd/pelv showed 5.5 x 3.8 cm left inferior rectus hematoma with small focus of possible contrast extravasation.  -Jose Antonio of General surgery was consulted, she recommended against reversing the Xarelto, conservative management  -Hemoglobin has been stable  -Resume Xarelto  -Repeat hemoglobin in a.m.  -If stable and no significant increase in pain, discharge to follow-up as an outpatient with Dr. Bloch as scheduled  -Pain controlled control with Tylenol as needed

## 2021-07-29 NOTE — H&P
Hospital Medicine History & Physical Note    Date of Service  7/29/2021    Primary Care Physician  Nir Hidalgo M.D.    Consultants  general surgery    Specialist Names: Dr. Brady    Code Status  Full Code    Chief Complaint  Chief Complaint   Patient presents with   • Abdominal Pain     started about 8 hours now   sharp pain         History of Presenting Illness  Reilly Swartz is a 61 y.o. male, recently diagnosed with left occlusive DVT in the popliteal vein 1 week ago on 7/21/2021 and started on Xarelto.  Today, he was doing push-ups and reports feeling a severe left side abdominal pain that progressively got worse reason why he decided to come back to the ER, reason why he comes today on 7/28/2021 for further evaluation.  Pain is sharp, rated as 6/10 in intensity, worse with palpation.  No alleviating factor.  Reports being healthy otherwise and not taking any other medication but Xarelto.    I discussed the plan of care with patient.    Review of Systems  Review of Systems   Constitutional: Negative for fever.   HENT: Negative for congestion and sore throat.    Eyes: Negative for blurred vision and double vision.   Respiratory: Negative for cough and shortness of breath.    Cardiovascular: Negative for chest pain and palpitations.   Gastrointestinal: Positive for abdominal pain. Negative for nausea and vomiting.   Genitourinary: Negative for dysuria and urgency.   Musculoskeletal: Negative for myalgias and neck pain.   Skin: Negative for itching and rash.   Neurological: Negative for dizziness, weakness and headaches.   Endo/Heme/Allergies: Does not bruise/bleed easily.   Psychiatric/Behavioral: Negative for depression. The patient does not have insomnia.        Past Medical History  DVT on xarelto    Surgical History   has a past surgical history that includes other abdominal surgery.     Family History  Reviewed and not pertinent  reviewed with patient. There is no family history that is  pertinent to the chief complaint.     Social History   reports that he has never smoked. He has never used smokeless tobacco. He reports previous alcohol use. He reports that he does not use drugs.    Allergies  No Known Allergies    Medications  Prior to Admission Medications   Prescriptions Last Dose Informant Patient Reported? Taking?   rivaroxaban (XARELTO) 15 MG Tab tablet   No No   Sig: Take 1 tablet by mouth 2 times a day for 21 days.      Facility-Administered Medications: None       Physical Exam  Temp:  [37.6 °C (99.6 °F)] 37.6 °C (99.6 °F)  Pulse:  [73-78] 73  Resp:  [18] 18  BP: (118-121)/(67-80) 121/80  SpO2:  [94 %-95 %] 94 %    Physical Exam  Constitutional:       Appearance: Normal appearance.   HENT:      Head: Normocephalic and atraumatic.      Nose: Nose normal.      Mouth/Throat:      Mouth: Mucous membranes are moist.      Pharynx: Oropharynx is clear.   Eyes:      Extraocular Movements: Extraocular movements intact.      Pupils: Pupils are equal, round, and reactive to light.   Cardiovascular:      Rate and Rhythm: Normal rate and regular rhythm.      Pulses: Normal pulses.      Heart sounds: Normal heart sounds.   Pulmonary:      Effort: Pulmonary effort is normal.      Breath sounds: Normal breath sounds.   Abdominal:      General: Abdomen is flat. Bowel sounds are normal. There is no distension.      Palpations: Abdomen is soft.      Tenderness: There is abdominal tenderness (Moderate pain on palpation to the LLQ). There is no guarding or rebound.   Musculoskeletal:      Cervical back: Normal range of motion and neck supple.   Skin:     General: Skin is warm and dry.   Neurological:      General: No focal deficit present.      Mental Status: He is alert and oriented to person, place, and time.   Psychiatric:         Mood and Affect: Mood normal.         Behavior: Behavior normal.         Laboratory:  Recent Labs     07/28/21  2217   WBC 9.8   RBC 4.46*   HEMOGLOBIN 13.5*   HEMATOCRIT 40.3*    MCV 90.4   MCH 30.3   MCHC 33.5*   RDW 42.7   PLATELETCT 276   MPV 8.9*     Recent Labs     07/28/21 2217   SODIUM 139   POTASSIUM 3.7   CHLORIDE 104   CO2 26   GLUCOSE 145*   BUN 17   CREATININE 0.82   CALCIUM 9.0     Recent Labs     07/28/21 2217   ALTSGPT 22   ASTSGOT 21   ALKPHOSPHAT 68   TBILIRUBIN 0.4   LIPASE 27   GLUCOSE 145*     Recent Labs     07/28/21 2217   APTT 42.5*   INR 1.92*     No results for input(s): NTPROBNP in the last 72 hours.      No results for input(s): TROPONINT in the last 72 hours.    Imaging:  CT-ABDOMEN-PELVIS WITH   Final Result      1.  Left inferior rectus hematoma with tiny focus of active contrast extravasation.   2.  Asymmetric enlargement of the left external iliac vein. Cannot exclude DVT. Ultrasound could be performed to further evaluate.   These findings were discussed with BRIAN LOVE on 7/29/2021 12:08 AM.                 Assessment/Plan:  I anticipate this patient is appropriate for observation status at this time.    * Rectus sheath hematoma  Assessment & Plan  -Observation status to medical floor.  -CT of the abdomen and pelvis is showing 5.5 x 3.8 cm left inferior rectus hematoma with small focus of possible contrast extravasation.  -ERP discussed the case with general surgery on-call called Dr. Brady.They discussed risks and benefits of possibly reversing Xarelto, and decided to not do so, as this is a superficial bleed and DVT is recent with signs of engorgement of the left external iliac vein with underlying DVT.  Surgery recommended serial H&H, applying pressure and close monitor.  I will hold off on Xarelto for now until further clarification of possible active bleeding  -I appreciate general surgery consult and recommendations in the morning.  -Pain control as needed.    Deep vein thrombosis (HCC)- (present on admission)  Assessment & Plan  -Occlusive DVT in the popliteal vein diagnosed 1 week ago on 7/21/2021 and started on Xarelto for this.  -His  pain is controlled.  -Holding Xarelto dose until further clarification of bleed, as above.    Elevated glucose  Assessment & Plan  -Likely reactive but 145 on admission therefore checking hemoglobin A1c.  No history of diabetes.    Chronic anticoagulation  Assessment & Plan  -Started on Xarelto for popliteal DVT.  -Plan as above.      VTE prophylaxis: SCDs/TEDs and therapeutic anticoagulation with Xarelto, now coming with bleeding

## 2021-07-29 NOTE — PROGRESS NOTES
Hospital Medicine Daily Progress Note    Date of Service  7/29/2021    Chief Complaint  Reilly Swartz is a 61 y.o. male admitted 7/28/2021 with abdominal pain    Hospital Course  History of occlusive left popliteal DVT diagnosed 1 week ago, started on Xarelto admitted for sudden onset abdominal discomfort after doing crunches found to have a rectus sheath hematoma.  General surgery was consulted and Xarelto was held.      Interval Problem Update  7/29: Initial downtrend in hemoglobin however, began trending up.  Pain is well controlled.  Denies shortness of breath or chest pain while holding Xarelto.  Discussed risks and benefits of holding anticoagulation    I have personally seen and examined the patient at bedside. I discussed the plan of care with patient and bedside RN.    Consultants/Specialty  general surgery    Code Status  Full Code    Disposition  Patient is not medically cleared.   Anticipate discharge to to home with close outpatient follow-up.  I have placed the appropriate orders for post-discharge needs.    Review of Systems  Review of Systems   Constitutional: Negative for chills, fever and malaise/fatigue.   HENT: Negative for sore throat.    Respiratory: Negative for cough and shortness of breath.    Cardiovascular: Negative for chest pain and palpitations.   Gastrointestinal: Negative for abdominal pain (Mild), blood in stool, diarrhea, heartburn, nausea and vomiting.   Genitourinary: Negative for dysuria and frequency.   Musculoskeletal: Negative for back pain and myalgias.   Neurological: Negative for dizziness, focal weakness, weakness and headaches.   Psychiatric/Behavioral: Negative for depression and hallucinations.   All other systems reviewed and are negative.       Physical Exam  Temp:  [36.9 °C (98.4 °F)-37.6 °C (99.6 °F)] 37.3 °C (99.1 °F)  Pulse:  [55-78] 58  Resp:  [16-18] 16  BP: ()/(56-86) 101/59  SpO2:  [94 %-98 %] 97 %    Physical Exam  Constitutional:       Appearance:  Normal appearance.      Comments: Lying prone to add extra pressure to his abdomen   HENT:      Head: Normocephalic and atraumatic.      Nose: Nose normal.      Mouth/Throat:      Mouth: Mucous membranes are moist.   Eyes:      Extraocular Movements: Extraocular movements intact.      Pupils: Pupils are equal, round, and reactive to light.   Cardiovascular:      Rate and Rhythm: Normal rate and regular rhythm.      Heart sounds: No murmur heard.     Pulmonary:      Effort: Pulmonary effort is normal. No respiratory distress.      Breath sounds: Normal breath sounds. No stridor.   Abdominal:      General: Abdomen is flat. Bowel sounds are normal. There is no distension.      Palpations: Abdomen is soft.      Tenderness: There is abdominal tenderness.   Musculoskeletal:         General: No swelling, tenderness or deformity.      Cervical back: Normal range of motion and neck supple.   Skin:     General: Skin is warm and dry.      Coloration: Skin is not pale.   Neurological:      General: No focal deficit present.      Mental Status: He is alert and oriented to person, place, and time. Mental status is at baseline.   Psychiatric:         Mood and Affect: Mood normal.         Behavior: Behavior normal.         Thought Content: Thought content normal.         Fluids    Intake/Output Summary (Last 24 hours) at 7/29/2021 1226  Last data filed at 7/29/2021 0900  Gross per 24 hour   Intake 120 ml   Output --   Net 120 ml       Laboratory  Recent Labs     07/28/21 2217 07/29/21  0455 07/29/21  1149   WBC 9.8  --   --    RBC 4.46*  --   --    HEMOGLOBIN 13.5* 12.8* 13.0*   HEMATOCRIT 40.3* 37.9* 38.6*   MCV 90.4  --   --    MCH 30.3  --   --    MCHC 33.5*  --   --    RDW 42.7  --   --    PLATELETCT 276  --   --    MPV 8.9*  --   --      Recent Labs     07/28/21 2217   SODIUM 139   POTASSIUM 3.7   CHLORIDE 104   CO2 26   GLUCOSE 145*   BUN 17   CREATININE 0.82   CALCIUM 9.0     Recent Labs     07/28/21 2217   APTT 42.5*    INR 1.92*               Imaging  CT-ABDOMEN-PELVIS WITH   Final Result      1.  Left inferior rectus hematoma with tiny focus of active contrast extravasation.   2.  Asymmetric enlargement of the left external iliac vein. Cannot exclude DVT. Ultrasound could be performed to further evaluate.   These findings were discussed with BRIAN LOVE on 7/29/2021 12:08 AM.              Assessment/Plan  * Rectus sheath hematoma  Assessment & Plan  -Recently started on Xarelto, presented with abdominal pain after doing crunches  -CT abd/pelv showed 5.5 x 3.8 cm left inferior rectus hematoma with small focus of possible contrast extravasation.  -Jose Antonio of General surgery was consulted, she recommended against reversing the Xarelto  -Monitor H&H every 8, most recent is stable  -Hold Xarelto for now until hgb is stable for 24h, consider resuming in AM  -Controlled control with Tylenol as needed    Elevated glucose  Assessment & Plan  -Likely reactive but 145 on admission therefore checking hemoglobin A1c  -A1c normal    Chronic anticoagulation  Assessment & Plan  -Has been on Xarelto for popliteal DVT for 1 week  -Hold until rectus sheath hematoma is stable    Deep vein thrombosis (HCC)- (present on admission)  Assessment & Plan  -Occlusive DVT in the popliteal vein diagnosed 1 week ago on 7/21/2021 and started on Xarelto for this.  -His pain is controlled.  -Holding Xarelto dose until further clarification of bleed, as above.       VTE prophylaxis: SCDs/TEDsp-holding Xarelto due to active bleed    I have performed a physical exam and reviewed and updated ROS and Plan today (7/29/2021). In review of yesterday's note (7/28/2021), there are no changes except as documented above.

## 2021-07-29 NOTE — TELEPHONE ENCOUNTER
PT LVM stating he was seen by provider and had been vaccinated. He now has severe abdominal pain.

## 2021-07-29 NOTE — ED PROVIDER NOTES
ED Provider Note    ER PROVIDER NOTE        CHIEF COMPLAINT  Chief Complaint   Patient presents with   • Abdominal Pain     started about 8 hours now   sharp pain         HPI  Reilly Swartz is a 61 y.o. male who presents to the emergency department complaining of abdominal pain. Patient reports his pain began around 1:00 this afternoon. Has gradually worsened through the day. It is in the middle to the left side of his lower abdomen sharp in nature, worse with sitting up. There is no radiation to the pain. He reports no nausea or vomiting or diarrhea. He has felt constipated over the last day as well. His symptoms seem to begin after doing some push-ups. He reports no dysuria. No fevers or chills.    He did start Xarelto last week for DVT    REVIEW OF SYSTEMS  Pertinent positives include abdominal pain. Pertinent negatives include no vomiting. See HPI for details. All other systems reviewed and are negative.    PAST MEDICAL HISTORY       SURGICAL HISTORY   has a past surgical history that includes other abdominal surgery.    FAMILY HISTORY  No family history on file.    SOCIAL HISTORY  Social History     Socioeconomic History   • Marital status:      Spouse name: Not on file   • Number of children: Not on file   • Years of education: Not on file   • Highest education level: Not on file   Occupational History   • Not on file   Tobacco Use   • Smoking status: Never Smoker   • Smokeless tobacco: Never Used   Vaping Use   • Vaping Use: Never used   Substance and Sexual Activity   • Alcohol use: Not Currently   • Drug use: Never   • Sexual activity: Not on file   Other Topics Concern   • Not on file   Social History Narrative   • Not on file     Social Determinants of Health     Financial Resource Strain:    • Difficulty of Paying Living Expenses:    Food Insecurity:    • Worried About Running Out of Food in the Last Year:    • Ran Out of Food in the Last Year:    Transportation Needs:    • Lack of  Transportation (Medical):    • Lack of Transportation (Non-Medical):    Physical Activity:    • Days of Exercise per Week:    • Minutes of Exercise per Session:    Stress:    • Feeling of Stress :    Social Connections:    • Frequency of Communication with Friends and Family:    • Frequency of Social Gatherings with Friends and Family:    • Attends Sabianist Services:    • Active Member of Clubs or Organizations:    • Attends Club or Organization Meetings:    • Marital Status:    Intimate Partner Violence:    • Fear of Current or Ex-Partner:    • Emotionally Abused:    • Physically Abused:    • Sexually Abused:       Social History     Substance and Sexual Activity   Drug Use Never       CURRENT MEDICATIONS  Home Medications     Reviewed by Rosalia Lovelace R.N. (Registered Nurse) on 07/28/21 at 2147  Med List Status: <None>   Medication Last Dose Status   rivaroxaban (XARELTO) 15 MG Tab tablet  Active                ALLERGIES  No Known Allergies    PHYSICAL EXAM  VITAL SIGNS: /80   Pulse 73   Temp 37.6 °C (99.6 °F) (Temporal)   Resp 18   Ht 1.829 m (6')   Wt 82.5 kg (181 lb 14.1 oz)   SpO2 94%   BMI 24.67 kg/m²   Pulse ox interpretation: I interpret this pulse ox as normal.    Constitutional: Alert in no apparent distress.  HENT: No signs of trauma, Bilateral external ears normal, Nose normal.   Eyes: Pupils are equal and reactive, Conjunctiva normal, Non-icteric.   Neck: Normal range of motion, No tenderness, Supple, No stridor.   Lymphatic: No lymphadenopathy noted.   Cardiovascular: Regular rate and rhythm, no murmurs.   Thorax & Lungs: Normal breath sounds, No respiratory distress, No wheezing, No chest tenderness.   Abdomen: Bowel sounds normal, Soft, left suprapubic tenderness with few centimeter palpable mass, No pulsatile masses. No peritoneal signs.  Skin: Warm, Dry, No erythema, No rash.   Back: No bony tenderness, No CVA tenderness.   Extremities: Intact distal pulses, No edema, No  tenderness, No cyanosis, .  Musculoskeletal: Good range of motion in all major joints. No tenderness to palpation or major deformities noted.   Neurologic: Alert , Normal motor function, Normal sensory function, No focal deficits noted.   Psychiatric: Affect normal, Judgment normal, Mood normal.     DIAGNOSTIC STUDIES / PROCEDURES        LABS  Labs Reviewed   CBC WITH DIFFERENTIAL - Abnormal; Notable for the following components:       Result Value    RBC 4.46 (*)     Hemoglobin 13.5 (*)     Hematocrit 40.3 (*)     MCHC 33.5 (*)     MPV 8.9 (*)     Neutrophils-Polys 79.90 (*)     Lymphocytes 10.00 (*)     Neutrophils (Absolute) 7.81 (*)     Lymphs (Absolute) 0.98 (*)     All other components within normal limits   COMP METABOLIC PANEL - Abnormal; Notable for the following components:    Glucose 145 (*)     All other components within normal limits   APTT - Abnormal; Notable for the following components:    APTT 42.5 (*)     All other components within normal limits    Narrative:     Indicate which anticoagulants the patient is on:->UNKNOWN   PROTHROMBIN TIME - Abnormal; Notable for the following components:    PT 20.6 (*)     INR 1.92 (*)     All other components within normal limits    Narrative:     Indicate which anticoagulants the patient is on:->UNKNOWN   LIPASE   URINALYSIS   ESTIMATED GFR       All labs reviewed by me.    RADIOLOGY  CT-ABDOMEN-PELVIS WITH   Final Result      1.  Left inferior rectus hematoma with tiny focus of active contrast extravasation.   2.  Asymmetric enlargement of the left external iliac vein. Cannot exclude DVT. Ultrasound could be performed to further evaluate.   These findings were discussed with BRIAN LOVE on 7/29/2021 12:08 AM.           The radiologist's interpretation of all radiological studies have been reviewed and images independently viewed by me.    COURSE & MEDICAL DECISION MAKING  Nursing notes, VS, PMSFHx reviewed in chart.    10:11 PM Patient seen and examined at  bedside. Patient declines pain medication. Ordered for CBC, CMP, lipase, coags to evaluate his symptoms.     12:10 AM  Radiology results were discussed radiologist.  General surgery is paged    I discussed the case with Dr. Brady from general surgery.  No need to reverse the patient's Xarelto.  Would recommend abdominal binder with direct pressure on the area.  We will plan to admit the patient overnight to ensure there is no continued bleeding.  General surgery to evaluate the patient in the morning    Patient is reevaluated updated on plan, he is agreeable to this    Discussed case with Dr. Naik for hospitalization        Decision Making:  This is a 61 y.o. male presented with abdominal pain after starting anticoagulation and doing some push-ups.  He does have a rectus sheath hematoma with a small area of apparent active extravasation.  He is hemodynamically stable, hemoglobin is reassuring.  With his anticoagulated state and potential continued bleed abdominal binder is placed, patient is hospitalized for further care    Patient is hospitalized in stable condition    FINAL IMPRESSION  1. Hematoma of rectus sheath, initial encounter    2. Chronic anticoagulation    3. Lower abdominal pain         The note accurately reflects work and decisions made by me.  Diego Villegas M.D.  7/29/2021  12:57 AM

## 2021-07-30 LAB
ANION GAP SERPL CALC-SCNC: 8 MMOL/L (ref 7–16)
BUN SERPL-MCNC: 19 MG/DL (ref 8–22)
CALCIUM SERPL-MCNC: 9.2 MG/DL (ref 8.4–10.2)
CHLORIDE SERPL-SCNC: 103 MMOL/L (ref 96–112)
CO2 SERPL-SCNC: 26 MMOL/L (ref 20–33)
CREAT SERPL-MCNC: 0.77 MG/DL (ref 0.5–1.4)
ERYTHROCYTE [DISTWIDTH] IN BLOOD BY AUTOMATED COUNT: 42.5 FL (ref 35.9–50)
GLUCOSE SERPL-MCNC: 104 MG/DL (ref 65–99)
HCT VFR BLD AUTO: 41.4 % (ref 42–52)
HCT VFR BLD AUTO: 43.3 % (ref 42–52)
HGB BLD-MCNC: 13.8 G/DL (ref 14–18)
HGB BLD-MCNC: 14.1 G/DL (ref 14–18)
MCH RBC QN AUTO: 30.3 PG (ref 27–33)
MCHC RBC AUTO-ENTMCNC: 33.3 G/DL (ref 33.7–35.3)
MCV RBC AUTO: 91 FL (ref 81.4–97.8)
PLATELET # BLD AUTO: 277 K/UL (ref 164–446)
PMV BLD AUTO: 9.2 FL (ref 9–12.9)
POTASSIUM SERPL-SCNC: 4 MMOL/L (ref 3.6–5.5)
RBC # BLD AUTO: 4.55 M/UL (ref 4.7–6.1)
SODIUM SERPL-SCNC: 137 MMOL/L (ref 135–145)
WBC # BLD AUTO: 6.7 K/UL (ref 4.8–10.8)

## 2021-07-30 PROCEDURE — 86147 CARDIOLIPIN ANTIBODY EA IG: CPT | Mod: 91

## 2021-07-30 PROCEDURE — 80048 BASIC METABOLIC PNL TOTAL CA: CPT

## 2021-07-30 PROCEDURE — G0378 HOSPITAL OBSERVATION PER HR: HCPCS

## 2021-07-30 PROCEDURE — 85018 HEMOGLOBIN: CPT

## 2021-07-30 PROCEDURE — A9270 NON-COVERED ITEM OR SERVICE: HCPCS | Performed by: INTERNAL MEDICINE

## 2021-07-30 PROCEDURE — 94760 N-INVAS EAR/PLS OXIMETRY 1: CPT

## 2021-07-30 PROCEDURE — 36415 COLL VENOUS BLD VENIPUNCTURE: CPT

## 2021-07-30 PROCEDURE — 700102 HCHG RX REV CODE 250 W/ 637 OVERRIDE(OP): Performed by: INTERNAL MEDICINE

## 2021-07-30 PROCEDURE — 99226 PR SUBSEQUENT OBSERVATION CARE,LEVEL III: CPT | Performed by: INTERNAL MEDICINE

## 2021-07-30 PROCEDURE — 85014 HEMATOCRIT: CPT

## 2021-07-30 PROCEDURE — 85027 COMPLETE CBC AUTOMATED: CPT

## 2021-07-30 RX ADMIN — RIVAROXABAN 15 MG: 15 TABLET, FILM COATED ORAL at 11:00

## 2021-07-30 RX ADMIN — RIVAROXABAN 15 MG: 15 TABLET, FILM COATED ORAL at 18:21

## 2021-07-30 ASSESSMENT — ENCOUNTER SYMPTOMS
VOMITING: 0
CHILLS: 0
HALLUCINATIONS: 0
HEADACHES: 0
DEPRESSION: 0
SORE THROAT: 0
NAUSEA: 0
FEVER: 0
ABDOMINAL PAIN: 0
BLOOD IN STOOL: 0
BACK PAIN: 0
COUGH: 0
FOCAL WEAKNESS: 0
HEARTBURN: 0
SHORTNESS OF BREATH: 0
DIZZINESS: 0
PALPITATIONS: 0
MYALGIAS: 0
WEAKNESS: 0
DIARRHEA: 0

## 2021-07-30 NOTE — PROGRESS NOTES
Received report, assumed pt care. Discussed POC and labs. No c/o SOB. VSS. Will cont to monitor.

## 2021-07-30 NOTE — CARE PLAN
Problem: Pain - Standard  Goal: Alleviation of pain or a reduction in pain to the patient’s comfort goal  Outcome: Progressing  Flowsheets (Taken 7/29/2021 2230)  Non Verbal Scale:   Calm   Sleeping   Unlabored Breathing   The patient is Stable - Low risk of patient condition declining or worsening    Shift Goals  Clinical Goals: Monitor Hemoglobin  Patient Goals: Rest, Home tomorrow    Progress made toward(s) clinical / shift goals:  Pt educated to call for pain medication when needed.     Patient is not progressing towards the following goals:

## 2021-07-30 NOTE — DISCHARGE PLANNING
Anticipated Discharge Disposition: Home    Action: Discussed pt in morning rounds. Per MD, pt is clear to d/c today. Pt has 6-clicks score of 24. No d/c needs reported or identified at this time.    Barriers to Discharge: None    Plan: LSW to follow and assist as needed.    0950: LSW met with pt at bedside to complete CTT assessment. Pt confirmed information on face sheet. Pt drives and is independent with all ADLs/IADLs. Pt reports no d/c needs at this time. Pt states his wife, Jyothi will pick him up from the hospital. LSW provided pt with AD booklet.    1030: Discussed pt in IDT rounds. Per MD, pt will be a 10X10 tomorrow.    Care Transition Team Assessment    Information Source  Orientation Level: Oriented X4  Information Given By: Patient  Informant's Name: Reilly Swartz  Who is responsible for making decisions for patient? : Patient    Elopement Risk  Legal Hold: No  Ambulatory or Self Mobile in Wheelchair: Yes  Disoriented: No  Psychiatric Symptoms: None  History of Wandering: No  Elopement this Admit: No  Vocalizing Wanting to Leave: No  Displays Behaviors, Body Language Wanting to Leave: No-Not at Risk for Elopement  Elopement Risk: Not at Risk for Elopement    Interdisciplinary Discharge Planning  Primary Care Physician: Nir Hidalgo  Lives with - Patient's Self Care Capacity: Spouse  Patient or legal guardian wants to designate a caregiver: No  Support Systems: Family Member(s), Friends / Neighbors  Housing / Facility: 2 Story House  Patient Prefers to be Discharged to:: Home  Durable Medical Equipment: Not Applicable    Discharge Preparedness  What is your plan after discharge?: Home with help  What are your discharge supports?: Spouse  Prior Functional Level: Ambulatory, Drives Self, Independent with Activities of Daily Living, Independent with Medication Management  Difficulity with ADLs: None  Difficulity with IADLs: None    Functional Assesment  Prior Functional Level: Ambulatory, Drives Self,  Independent with Activities of Daily Living, Independent with Medication Management    Finances  Financial Barriers to Discharge: No    Vision / Hearing Impairment  Vision Impairment : Yes  Right Eye Vision: Impaired, Wears Glasses  Left Eye Vision: Impaired, Wears Glasses  Hearing Impairment : No    Advance Directive  Advance Directive?: None  Advance Directive offered?: AD Booklet given    Domestic Abuse  Have you ever been the victim of abuse or violence?: No  Physical Abuse or Sexual Abuse: No  Verbal Abuse or Emotional Abuse: No  Possible Abuse/Neglect Reported to:: Not Applicable    Discharge Risks or Barriers  Discharge risks or barriers?: No    Anticipated Discharge Information  Discharge Disposition: Discharged to home/self care (01)  Discharge Address: [address] [place] [address]

## 2021-07-30 NOTE — CARE PLAN
Problem: Knowledge Deficit - Standard  Goal: Patient and family/care givers will demonstrate understanding of plan of care, disease process/condition, diagnostic tests and medications  Outcome: Not Progressing     Problem: Pain - Standard  Goal: Alleviation of pain or a reduction in pain to the patient’s comfort goal  Outcome: Progressing     Problem: Gastrointestinal Irritability  Goal: Nausea and vomiting will be absent or improve  Outcome: Progressing   The patient is Stable - Low risk of patient condition declining or worsening    Shift Goals  Clinical Goals: Pt's hgb will not decrease  Patient Goals: Rest, Home tomorrow    Progress made toward(s) clinical / shift goals:  progressing    Patient is not progressing towards the following goals:      Problem: Knowledge Deficit - Standard  Goal: Patient and family/care givers will demonstrate understanding of plan of care, disease process/condition, diagnostic tests and medications  Outcome: Not Progressing because pt requiring frequent reorientation.

## 2021-07-30 NOTE — PROGRESS NOTES
Surgical Progress Note:    S:  - Feeling well, pain improving  - Ambulating  - No chest pain, extremity pain, or difficulty breathing    Vitals:  BP (!) 95/64   Pulse 98   Temp 36.4 °C (97.5 °F) (Oral)   Resp 16   Ht 1.829 m (6')   Wt 79 kg (174 lb 2.6 oz)   SpO2 93%   BMI 23.62 kg/m²     I/O:     Intake/Output Summary (Last 24 hours) at 7/30/2021 0810  Last data filed at 7/29/2021 2000  Gross per 24 hour   Intake 240 ml   Output --   Net 240 ml       P/E:  Constitutional: Appears well, no distress  Head/Neck: Normocephalic, atraumatic, neck supple  Cardiovascular: Normal rate and rhythm  Pulmonary/Chest: Normal respiratory effort, no wheezes  Abdominal: Soft, mildly tender along left rectus, no distension  Musculoskeletal: No deficits  Neurological:  Alert, oriented  Skin:  Warm and dry, no erythema  Extremities: No edema, no evidence of DVT    Labs:  Recent Labs     07/28/21 2217 07/29/21  0455 07/29/21  1149 07/29/21  1834 07/30/21  0234   WBC 9.8  --   --   --  6.7   RBC 4.46*  --   --   --  4.55*   HEMOGLOBIN 13.5*   < > 13.0* 13.7* 13.8*   HEMATOCRIT 40.3*   < > 38.6* 42.1 41.4*   MCV 90.4  --   --   --  91.0   MCH 30.3  --   --   --  30.3   RDW 42.7  --   --   --  42.5   PLATELETCT 276  --   --   --  277   MPV 8.9*  --   --   --  9.2   NEUTSPOLYS 79.90*  --   --   --   --    LYMPHOCYTES 10.00*  --   --   --   --    MONOCYTES 6.90  --   --   --   --    EOSINOPHILS 2.30  --   --   --   --    BASOPHILS 0.40  --   --   --   --     < > = values in this interval not displayed.     Recent Labs     07/28/21 2217 07/30/21  0234   SODIUM 139 137   POTASSIUM 3.7 4.0   CHLORIDE 104 103   CO2 26 26   GLUCOSE 145* 104*   BUN 17 19         A/P:   - H&H stable  - No need for surgical intervention  - Continue wearing binder for next few days  - Avoid exercise for at least 2 weeks  - Follow-up with thrombosis clinic  - Timing of restarting Xarelto as per hospitalist service  - Will sign off, please call if any  concerns  - No follow-up with me required        Mini Brady M.D.  [facility]  [phone]

## 2021-07-30 NOTE — PROGRESS NOTES
Report given to Mamie LUZ via bedside report. Patient handed off in stable condition. Safety measures in place. Call light within reach. Care relinquished.

## 2021-07-30 NOTE — PROGRESS NOTES
Hospital Medicine Daily Progress Note    Date of Service  7/30/2021    Chief Complaint  Reilly Swartz is a 61 y.o. male admitted 7/28/2021 with abdominal pain    Hospital Course  History of occlusive left popliteal DVT diagnosed 1 week ago, started on Xarelto admitted for sudden onset abdominal discomfort after doing crunches found to have a rectus sheath hematoma.  General surgery was consulted and Xarelto was held.      Interval Problem Update  7/29: Initial downtrend in hemoglobin however, began trending up.  Pain is well controlled.  Denies shortness of breath or chest pain while holding Xarelto.  Discussed risks and benefits of holding anticoagulation  7/30: Hgb stable, pain resolved.  Xarelto resumed.  ALP antibody ordered.    I have personally seen and examined the patient at bedside. I discussed the plan of care with patient and bedside RN.    Consultants/Specialty  general surgery- [place]    Code Status  Full Code    Disposition  Patient is not medically cleared.   Anticipate discharge to to home with close outpatient follow-up.  I have placed the appropriate orders for post-discharge needs.    Review of Systems  Review of Systems   Constitutional: Negative for chills, fever and malaise/fatigue.   HENT: Negative for sore throat.    Respiratory: Negative for cough and shortness of breath.    Cardiovascular: Negative for chest pain and palpitations.   Gastrointestinal: Negative for abdominal pain (Resolved), blood in stool, diarrhea, heartburn, nausea and vomiting.   Genitourinary: Negative for dysuria and frequency.   Musculoskeletal: Negative for back pain and myalgias.   Neurological: Negative for dizziness, focal weakness, weakness and headaches.   Psychiatric/Behavioral: Negative for depression and hallucinations.   All other systems reviewed and are negative.       Physical Exam  Temp:  [36.4 °C (97.5 °F)-36.7 °C (98 °F)] 36.4 °C (97.5 °F)  Pulse:  [56-98] 98  Resp:  [16] 16  BP: ()/(64-72)  95/64  SpO2:  [93 %-99 %] 93 %    Physical Exam  Constitutional:       Appearance: Normal appearance.   HENT:      Head: Normocephalic and atraumatic.      Nose: Nose normal.      Mouth/Throat:      Mouth: Mucous membranes are moist.   Eyes:      Extraocular Movements: Extraocular movements intact.      Pupils: Pupils are equal, round, and reactive to light.   Cardiovascular:      Rate and Rhythm: Normal rate and regular rhythm.      Heart sounds: No murmur heard.     Pulmonary:      Effort: Pulmonary effort is normal. No respiratory distress.      Breath sounds: Normal breath sounds. No stridor.   Abdominal:      General: Abdomen is flat. Bowel sounds are normal. There is no distension.      Palpations: Abdomen is soft.      Tenderness: There is no abdominal tenderness.   Musculoskeletal:         General: No swelling, tenderness or deformity.      Cervical back: Normal range of motion and neck supple.   Skin:     General: Skin is warm and dry.      Coloration: Skin is not pale.   Neurological:      General: No focal deficit present.      Mental Status: He is alert and oriented to person, place, and time. Mental status is at baseline.   Psychiatric:         Mood and Affect: Mood normal.         Behavior: Behavior normal.         Thought Content: Thought content normal.         Fluids    Intake/Output Summary (Last 24 hours) at 7/30/2021 1102  Last data filed at 7/30/2021 0800  Gross per 24 hour   Intake 360 ml   Output --   Net 360 ml       Laboratory  Recent Labs     07/28/21  2217 07/29/21  0455 07/29/21  1834 07/30/21  0234 07/30/21  1022   WBC 9.8  --   --  6.7  --    RBC 4.46*  --   --  4.55*  --    HEMOGLOBIN 13.5*   < > 13.7* 13.8* 14.1   HEMATOCRIT 40.3*   < > 42.1 41.4* 43.3   MCV 90.4  --   --  91.0  --    MCH 30.3  --   --  30.3  --    MCHC 33.5*  --   --  33.3*  --    RDW 42.7  --   --  42.5  --    PLATELETCT 276  --   --  277  --    MPV 8.9*  --   --  9.2  --     < > = values in this interval not  displayed.     Recent Labs     07/28/21 2217 07/30/21  0234   SODIUM 139 137   POTASSIUM 3.7 4.0   CHLORIDE 104 103   CO2 26 26   GLUCOSE 145* 104*   BUN 17 19   CREATININE 0.82 0.77   CALCIUM 9.0 9.2     Recent Labs     07/28/21 2217   APTT 42.5*   INR 1.92*               Imaging  CT-ABDOMEN-PELVIS WITH   Final Result      1.  Left inferior rectus hematoma with tiny focus of active contrast extravasation.   2.  Asymmetric enlargement of the left external iliac vein. Cannot exclude DVT. Ultrasound could be performed to further evaluate.   These findings were discussed with BRIAN LOVE on 7/29/2021 12:08 AM.              Assessment/Plan  * Rectus sheath hematoma  Assessment & Plan  -Recently started on Xarelto for DVT, presented with abdominal pain after doing crunches  -CT abd/pelv showed 5.5 x 3.8 cm left inferior rectus hematoma with small focus of possible contrast extravasation.  -Jose Antonio of General surgery was consulted, she recommended against reversing the Xarelto, conservative management  -Hemoglobin has been stable  -Resume Xarelto  -Repeat hemoglobin in a.m.  -If stable and no significant increase in pain, discharge to follow-up as an outpatient with Dr. Bloch as scheduled  -Pain controlled control with Tylenol as needed    Elevated glucose  Assessment & Plan  -Likely reactive but 145 on admission therefore checking hemoglobin A1c  -A1c normal    Chronic anticoagulation  Assessment & Plan  -Has been on Xarelto for popliteal DVT for 1 week  -Rectus sheath hematoma is stable  -Resume xarelto and monitor    Deep vein thrombosis (HCC)- (present on admission)  Assessment & Plan  -Occlusive DVT in the popliteal vein diagnosed on 7/21/2021 and started on Xarelto for this.  -FMHx of antiphospholipid antibody syndrome in brother and half sister  -Check anticardiolipin antibody panel - if positive - will need lifelong anticoagulation  -Resume xarelto       VTE prophylaxis: SCDs/TEDsp-holding Xarelto due to  active bleed    I have performed a physical exam and reviewed and updated ROS and Plan today (7/30/2021). In review of yesterday's note (7/29/2021), there are no changes except as documented above.

## 2021-07-31 VITALS
OXYGEN SATURATION: 94 % | BODY MASS INDEX: 23.59 KG/M2 | RESPIRATION RATE: 18 BRPM | HEIGHT: 72 IN | HEART RATE: 52 BPM | WEIGHT: 174.16 LBS | DIASTOLIC BLOOD PRESSURE: 67 MMHG | TEMPERATURE: 97.7 F | SYSTOLIC BLOOD PRESSURE: 112 MMHG

## 2021-07-31 LAB
HCT VFR BLD AUTO: 40.2 % (ref 42–52)
HGB BLD-MCNC: 13.3 G/DL (ref 14–18)

## 2021-07-31 PROCEDURE — A9270 NON-COVERED ITEM OR SERVICE: HCPCS | Performed by: INTERNAL MEDICINE

## 2021-07-31 PROCEDURE — 99217 PR OBSERVATION CARE DISCHARGE: CPT | Performed by: INTERNAL MEDICINE

## 2021-07-31 PROCEDURE — 85018 HEMOGLOBIN: CPT

## 2021-07-31 PROCEDURE — 36415 COLL VENOUS BLD VENIPUNCTURE: CPT

## 2021-07-31 PROCEDURE — 85014 HEMATOCRIT: CPT

## 2021-07-31 PROCEDURE — G0378 HOSPITAL OBSERVATION PER HR: HCPCS

## 2021-07-31 PROCEDURE — 700102 HCHG RX REV CODE 250 W/ 637 OVERRIDE(OP): Performed by: INTERNAL MEDICINE

## 2021-07-31 RX ADMIN — RIVAROXABAN 15 MG: 15 TABLET, FILM COATED ORAL at 07:59

## 2021-07-31 NOTE — DISCHARGE SUMMARY
Discharge Summary    CHIEF COMPLAINT ON ADMISSION  Chief Complaint   Patient presents with   • Abdominal Pain     started about 8 hours now   sharp pain         Reason for Admission  Abdominal Pain      Admission Date  7/28/2021    CODE STATUS  Full Code    HPI & HOSPITAL COURSE  This is a 61 y.o. male with a History of occlusive left popliteal DVT diagnosed 1 week ago, started on Xarelto admitted for sudden onset abdominal discomfort after doing crunches found to have a rectus sheath hematoma.  General surgery was consulted and Xarelto was held.  Surgery recommended conservative management.  His hemoglobin was trended and showed a very slight downtrend however quickly began to rise.  He remained stable off of Xarelto for 24 hours therefore his Xarelto was resumed.  He was monitored for an additional 24 hours and he continued to have significant improvement of his abdominal discomfort.  Hemoglobin remained largely stable therefore he was determined stable to discharge to resume Coumadin therapy.  He gave further history that he has a brother and half sister who have antiphospholipid syndrome therefore the possibility of lifelong anticoagulation was discussed with him.  Antiphospholipid antibody was checked however is pending at the time of this dictation.  The patient has an appointment to follow-up with Dr. Michael Bloch to discuss further anticoagulation treatment/duration.  This lab can be followed up and/or repeated if needed at that appointment.      Therefore, he is discharged in good and stable condition to home with close outpatient follow-up.    The patient met 2-midnight criteria for an inpatient stay at the time of discharge.    Discharge Date  7/31/2021    FOLLOW UP ITEMS POST DISCHARGE  Follow-up as scheduled with Dr. Bloch    DISCHARGE DIAGNOSES  Principal Problem:    Rectus sheath hematoma POA: Unknown  Active Problems:    Deep vein thrombosis (HCC) POA: Yes    Chronic anticoagulation POA: Unknown     Elevated glucose POA: Unknown  Resolved Problems:    * No resolved hospital problems. *      FOLLOW UP  Future Appointments   Date Time Provider Department Center   8/10/2021  7:30 AM [facility] EXAM 4 [facility] None   11/2/2021  7:00 AM Nir Hidalgo M.D. [facility] None     Nir Hidalgo M.D.  [address] [address]  [phone]            MEDICATIONS ON DISCHARGE     Medication List      CONTINUE taking these medications      Instructions   rivaroxaban 15 MG Tabs tablet  Commonly known as: Xarelto   Take 1 tablet by mouth 2 times a day for 21 days.  Dose: 15 mg            Allergies  No Known Allergies    DIET  Orders Placed This Encounter   Procedures   • Diet Order Diet: Cardiac     Standing Status:   Standing     Number of Occurrences:   1     Order Specific Question:   Diet:     Answer:   Cardiac [6]       ACTIVITY  As tolerated.  Weight bearing as tolerated    CONSULTATIONS  Dr. Brady - Surgery    PROCEDURES  None    LABORATORY  Lab Results   Component Value Date    SODIUM 137 07/30/2021    POTASSIUM 4.0 07/30/2021    CHLORIDE 103 07/30/2021    CO2 26 07/30/2021    GLUCOSE 104 (H) 07/30/2021    BUN 19 07/30/2021    CREATININE 0.77 07/30/2021        Lab Results   Component Value Date    WBC 6.7 07/30/2021    HEMOGLOBIN 13.3 (L) 07/31/2021    HEMATOCRIT 40.2 (L) 07/31/2021    PLATELETCT 277 07/30/2021        Total time of the discharge process exceeds 38 minutes.

## 2021-07-31 NOTE — PROGRESS NOTES
Report received from day RN. Pt AAOx4. Resting in bed w/o signs of discomfort or distress. Pt denies any pain, nausea; was seen ambulating/standing at bedside early at shift change. Gait steady. POC discussed with pt. Pt verbalized understanding. No other needs at this time.

## 2021-07-31 NOTE — DISCHARGE INSTRUCTIONS
Discharge Instructions    Discharged to home by car with relative. Discharged via walking, hospital escort: Yes.  Special equipment needed: Not Applicable    Be sure to schedule a follow-up appointment with your primary care doctor or any specialists as instructed.     Discharge Plan:        I understand that a diet low in cholesterol, fat, and sodium is recommended for good health. Unless I have been given specific instructions below for another diet, I accept this instruction as my diet prescription.   Other diet: n/a    Special Instructions: None    · Is patient discharged on Warfarin / Coumadin?   No     Depression / Suicide Risk    As you are discharged from this Critical access hospital facility, it is important to learn how to keep safe from harming yourself.    Recognize the warning signs:  · Abrupt changes in personality, positive or negative- including increase in energy   · Giving away possessions  · Change in eating patterns- significant weight changes-  positive or negative  · Change in sleeping patterns- unable to sleep or sleeping all the time   · Unwillingness or inability to communicate  · Depression  · Unusual sadness, discouragement and loneliness  · Talk of wanting to die  · Neglect of personal appearance   · Rebelliousness- reckless behavior  · Withdrawal from people/activities they love  · Confusion- inability to concentrate     If you or a loved one observes any of these behaviors or has concerns about self-harm, here's what you can do:  · Talk about it- your feelings and reasons for harming yourself  · Remove any means that you might use to hurt yourself (examples: pills, rope, extension cords, firearm)  · Get professional help from the community (Mental Health, Substance Abuse, psychological counseling)  · Do not be alone:Call your Safe Contact- someone whom you trust who will be there for you.  · Call your local CRISIS HOTLINE 928-4211 or 680-633-9288  · Call your local Children's Mobile Crisis  Response Team [facility] (292) 782-9773 or www.[employer]  · Call the toll free National Suicide Prevention Hotlines   · National Suicide Prevention Lifeline 115-844-SBLV (0574)  · National Hope Line Network 800-SUICIDE (381-9987)        Deep Vein Thrombosis    Deep vein thrombosis (DVT) is a condition in which a blood clot forms in a deep vein, such as a lower leg, thigh, or arm vein. A clot is blood that has thickened into a gel or solid. This condition is dangerous. It can lead to serious and even life-threatening complications if the clot travels to the lungs and causes a blockage (pulmonary embolism). It can also damage veins in the leg. This can result in leg pain, swelling, discoloration, and sores (post-thrombotic syndrome).  What are the causes?  This condition may be caused by:  · A slowdown of blood flow.  · Damage to a vein.  · A condition that causes blood to clot more easily, such as an inherited clotting disorder.  What increases the risk?  The following factors may make you more likely to develop this condition:  · Being overweight.  · Being older, especially over age 60.  · Sitting or lying down for more than four hours.  · Being in the hospital.  · Lack of physical activity (sedentary lifestyle).  · Pregnancy, being in childbirth, or having recently given birth.  · Taking medicines that contain estrogen, such as medicines to prevent pregnancy.  · Smoking.  · A history of any of the following:  ? Blood clots or a blood clotting disease.  ? Peripheral vascular disease.  ? Inflammatory bowel disease.  ? Cancer.  ? Heart disease.  ? Genetic conditions that affect how your blood clots, such as Factor V Leiden mutation.  ? Neurological diseases that affect your legs (leg paresis).  ? A recent injury, such as a car accident.  ? Major or lengthy surgery.  ? A central line placed inside a large vein.  What are the signs or symptoms?  Symptoms of this condition include:  · Swelling, pain, or  tenderness in an arm or leg.  · Warmth, redness, or discoloration in an arm or leg.  If the clot is in your leg, symptoms may be more noticeable or worse when you stand or walk. Some people may not develop any symptoms.  How is this diagnosed?  This condition is diagnosed with:  · A medical history and physical exam.  · Tests, such as:  ? Blood tests. These are done to check how well your blood clots.  ? Ultrasound. This is done to check for clots.  ? Venogram. For this test, contrast dye is injected into a vein and X-rays are taken to check for any clots.  How is this treated?  Treatment for this condition depends on:  · The cause of your DVT.  · Your risk for bleeding or developing more clots.  · Any other medical conditions that you have.  Treatment may include:  · Taking a blood thinner (anticoagulant). This type of medicine prevents clots from forming. It may be taken by mouth, injected under the skin, or injected through an IV (catheter).  · Injecting clot-dissolving medicines into the affected vein (catheter-directed thrombolysis).  · Having surgery. Surgery may be done to:  ? Remove the clot.  ? Place a filter in a large vein to catch blood clots before they reach the lungs.  Some treatments may be continued for up to six months.  Follow these instructions at home:  If you are taking blood thinners:  · Take the medicine exactly as told by your health care provider. Some blood thinners need to be taken at the same time every day. Do not skip a dose.  · Talk with your health care provider before you take any medicines that contain aspirin or NSAIDs. These medicines increase your risk for dangerous bleeding.  · Ask your health care provider about foods and drugs that could change the way the medicine works (may interact). Avoid those things if your health care provider tells you to do so.  · Blood thinners can cause easy bruising and may make it difficult to stop bleeding. Because of this:  ? Be very careful  when using knives, scissors, or other sharp objects.  ? Use an electric razor instead of a blade.  ? Avoid activities that could cause injury or bruising, and follow instructions about how to prevent falls.  · Wear a medical alert bracelet or carry a card that lists what medicines you take.  General instructions  · Take over-the-counter and prescription medicines only as told by your health care provider.  · Return to your normal activities as told by your health care provider. Ask your health care provider what activities are safe for you.  · Wear compression stockings if recommended by your health care provider.  · Keep all follow-up visits as told by your health care provider. This is important.  How is this prevented?  To lower your risk of developing this condition again:  · For 30 or more minutes every day, do an activity that:  ? Involves moving your arms and legs.  ? Increases your heart rate.  · When traveling for longer than four hours:  ? Exercise your arms and legs every hour.  ? Drink plenty of water.  ? Avoid drinking alcohol.  · Avoid sitting or lying for a long time without moving your legs.  · If you have surgery or you are hospitalized, ask about ways to prevent blood clots. These may include taking frequent walks or using anticoagulants.  · Stay at a healthy weight.  · If you are a woman who is older than age 35, avoid unnecessary use of medicines that contain estrogen, such as some birth control pills.  · Do not use any products that contain nicotine or tobacco, such as cigarettes and e-cigarettes. This is especially important if you take estrogen medicines. If you need help quitting, ask your health care provider.  Contact a health care provider if:  · You miss a dose of your blood thinner.  · Your menstrual period is heavier than usual.  · You have unusual bruising.  Get help right away if:  · You have:  ? New or increased pain, swelling, or redness in an arm or leg.  ? Numbness or tingling in  an arm or leg.  ? Shortness of breath.  ? Chest pain.  ? A rapid or irregular heartbeat.  ? A severe headache or confusion.  ? A cut that will not stop bleeding.  · There is blood in your vomit, stool, or urine.  · You have a serious fall or accident, or you hit your head.  · You feel light-headed or dizzy.  · You cough up blood.  These symptoms may represent a serious problem that is an emergency. Do not wait to see if the symptoms will go away. Get medical help right away. Call your local emergency services (911 in the [place] Do not drive yourself to the hospital.  Summary  · Deep vein thrombosis (DVT) is a condition in which a blood clot forms in a deep vein, such as a lower leg, thigh, or arm vein.  · Symptoms can include swelling, warmth, pain, and redness in your leg or arm.  · This condition may be treated with a blood thinner (anticoagulant medicine), medicine that is injected to dissolve blood clots,compression stockings, or surgery.  · If you are prescribed blood thinners, take them exactly as told.  This information is not intended to replace advice given to you by your health care provider. Make sure you discuss any questions you have with your health care provider.  Document Released: 12/18/2006 Document Revised: 11/30/2018 Document Reviewed: 05/18/2018  Elsevier Patient Education © 2020 [employer]

## 2021-07-31 NOTE — CARE PLAN
Problem: Knowledge Deficit - Standard  Goal: Patient and family/care givers will demonstrate understanding of plan of care, disease process/condition, diagnostic tests and medications  Outcome: Progressing

## 2021-07-31 NOTE — PROGRESS NOTES
Received report, assumed pt care. Discussed POC. Pt reports abd pain has improved. VSS. Will cont to monitor.

## 2021-07-31 NOTE — CARE PLAN
The patient is Stable - Low risk of patient condition declining or worsening    Shift Goals  Clinical Goals: Pt's hgb will remain stable or will improve.  Patient Goals: Pt will remain free from nausea or pain.     Progress made toward(s) clinical / shift goals:  Pt's recent H/H is WDL. Pt denies any nausea or pain this shift.     Patient is not progressing towards the following goals:      Problem: Knowledge Deficit - Standard  Goal: Patient and family/care givers will demonstrate understanding of plan of care, disease process/condition, diagnostic tests and medications  Outcome: Progressing     Problem: Pain - Standard  Goal: Alleviation of pain or a reduction in pain to the patient’s comfort goal  Outcome: Progressing     Problem: Gastrointestinal Irritability  Goal: Nausea and vomiting will be absent or improve  Outcome: Progressing

## 2021-08-01 LAB
CARDIOLIPIN IGA SER IA-ACNC: <10 APL (ref 0–11)
CARDIOLIPIN IGG SER IA-ACNC: <10 GPL (ref 0–14)
CARDIOLIPIN IGM SER IA-ACNC: <10 MPL (ref 0–12)

## 2021-08-03 ENCOUNTER — HOSPITAL ENCOUNTER (OUTPATIENT)
Dept: RADIOLOGY | Facility: MEDICAL CENTER | Age: 61
End: 2021-08-03
Attending: FAMILY MEDICINE
Payer: COMMERCIAL

## 2021-08-03 ENCOUNTER — HOSPITAL ENCOUNTER (OUTPATIENT)
Dept: LAB | Facility: MEDICAL CENTER | Age: 61
End: 2021-08-03
Attending: FAMILY MEDICINE
Payer: COMMERCIAL

## 2021-08-03 ENCOUNTER — OFFICE VISIT (OUTPATIENT)
Dept: MEDICAL GROUP | Facility: LAB | Age: 61
End: 2021-08-03
Payer: COMMERCIAL

## 2021-08-03 VITALS
TEMPERATURE: 98.7 F | WEIGHT: 175 LBS | HEART RATE: 66 BPM | SYSTOLIC BLOOD PRESSURE: 112 MMHG | RESPIRATION RATE: 12 BRPM | OXYGEN SATURATION: 96 % | HEIGHT: 72 IN | BODY MASS INDEX: 23.7 KG/M2 | DIASTOLIC BLOOD PRESSURE: 70 MMHG

## 2021-08-03 DIAGNOSIS — S30.1XXD HEMATOMA OF RECTUS SHEATH, SUBSEQUENT ENCOUNTER: ICD-10-CM

## 2021-08-03 DIAGNOSIS — I82.432 ACUTE DEEP VEIN THROMBOSIS (DVT) OF POPLITEAL VEIN OF LEFT LOWER EXTREMITY (HCC): ICD-10-CM

## 2021-08-03 DIAGNOSIS — Z13.6 SCREENING FOR CARDIOVASCULAR CONDITION: ICD-10-CM

## 2021-08-03 DIAGNOSIS — Z72.89 OTHER PROBLEMS RELATED TO LIFESTYLE: ICD-10-CM

## 2021-08-03 DIAGNOSIS — Z79.01 CHRONIC ANTICOAGULATION: ICD-10-CM

## 2021-08-03 DIAGNOSIS — M25.641 FINGER STIFFNESS, RIGHT: ICD-10-CM

## 2021-08-03 LAB
CHOLEST SERPL-MCNC: 138 MG/DL (ref 100–199)
ERYTHROCYTE [DISTWIDTH] IN BLOOD BY AUTOMATED COUNT: 44 FL (ref 35.9–50)
HCT VFR BLD AUTO: 39.5 % (ref 42–52)
HCV AB SER QL: NORMAL
HDLC SERPL-MCNC: 36 MG/DL
HGB BLD-MCNC: 12.9 G/DL (ref 14–18)
LDLC SERPL CALC-MCNC: 88 MG/DL
MCH RBC QN AUTO: 30.4 PG (ref 27–33)
MCHC RBC AUTO-ENTMCNC: 32.7 G/DL (ref 33.7–35.3)
MCV RBC AUTO: 93.2 FL (ref 81.4–97.8)
PLATELET # BLD AUTO: 310 K/UL (ref 164–446)
PMV BLD AUTO: 9.8 FL (ref 9–12.9)
RBC # BLD AUTO: 4.24 M/UL (ref 4.7–6.1)
TRIGL SERPL-MCNC: 69 MG/DL (ref 0–149)
WBC # BLD AUTO: 5.5 K/UL (ref 4.8–10.8)

## 2021-08-03 PROCEDURE — 80061 LIPID PANEL: CPT

## 2021-08-03 PROCEDURE — 73140 X-RAY EXAM OF FINGER(S): CPT | Mod: RT

## 2021-08-03 PROCEDURE — 85027 COMPLETE CBC AUTOMATED: CPT

## 2021-08-03 PROCEDURE — 99213 OFFICE O/P EST LOW 20 MIN: CPT | Performed by: FAMILY MEDICINE

## 2021-08-03 PROCEDURE — 36415 COLL VENOUS BLD VENIPUNCTURE: CPT

## 2021-08-03 PROCEDURE — 86803 HEPATITIS C AB TEST: CPT

## 2021-08-03 ASSESSMENT — FIBROSIS 4 INDEX: FIB4 SCORE: 0.99

## 2021-08-03 ASSESSMENT — ENCOUNTER SYMPTOMS
CHILLS: 0
HEADACHES: 0
SHORTNESS OF BREATH: 0
DIZZINESS: 0
FEVER: 0

## 2021-08-03 NOTE — PROGRESS NOTES
Subjective:     CC: Hospital follow up    HPI:   Reilly presents today to follow-up on recent hospitalization.    Was admitted 7/28-7/31 with rectus sheath hematoma after being started on Xarelto the week prior for left popliteal DVT.  Reports no return of pain, no increasing swelling at home.  He is back on Xarelto.  There was a question of family history of antiphospholipid syndrome.  He did have negative anticardiolipin antibodies drawn during hospitalization.  He has concerns and questions today regarding plan going forward, ability to monitor Xarelto levels.    Medications, past medical history, allergies, and social history have been reviewed and updated.    ROS:  Review of Systems   Constitutional: Negative for chills and fever.   Respiratory: Negative for shortness of breath.    Cardiovascular: Negative for chest pain.   Neurological: Negative for dizziness and headaches.      Objective:       Exam:  /70 (BP Location: Left arm, Patient Position: Sitting, BP Cuff Size: Adult)   Pulse 66   Temp 37.1 °C (98.7 °F)   Resp 12   Ht 1.829 m (6')   Wt 79.4 kg (175 lb)   SpO2 96%   BMI 23.73 kg/m²  Body mass index is 23.73 kg/m².    Constitutional: Alert. Well appearing. No distress.  Skin: Warm, dry, good turgor, no visible rashes.  Eye: Equal, round and reactive to light, conjunctiva clear, lids normal.  ENMT: Moist mucous membranes. Normal dentition.  Respiratory: Normal effort.   Abdomen: No tenderness to left side of abdomen, no obvious distention or swelling.  Neuro: Moves all four extremities. No facial droop.  Psych: Answers questions appropriately. Normal affect and mood.      Assessment & Plan:     61 y.o. male with the following -     1. Hematoma of rectus sheath, subsequent encounter  Was admitted 7/28-7/31 with rectus sheath hematoma after being started on Xarelto the week prior for left popliteal DVT.  Symptoms improved clinically.  Will follow H/H.  Discussed reasons to seek care.  - CBC  WITHOUT DIFFERENTIAL; Future    2. Acute deep vein thrombosis (DVT) of popliteal vein of left lower extremity (HCC)  3. Chronic anticoagulation  On Xarelto for now.  Has follow-up with vascular medicine.  Family history of antiphospholipid syndrome, negative cardiolipin antibodies but addiitonal testing not done.  Will plan for continue Xarelto for at least 3 months and await vascular medicine recommendations.      Please note that this note was created using voice recognition software.

## 2021-08-04 ENCOUNTER — TELEPHONE (OUTPATIENT)
Dept: MEDICAL GROUP | Facility: LAB | Age: 61
End: 2021-08-04

## 2021-08-04 DIAGNOSIS — S30.1XXD HEMATOMA OF RECTUS SHEATH, SUBSEQUENT ENCOUNTER: ICD-10-CM

## 2021-08-04 NOTE — TELEPHONE ENCOUNTER
Phone Number Called: [phone] (home)     Call outcome: Did not leave a detailed message. Requested patient to call back.    Message: return call regarding lab results

## 2021-08-05 ENCOUNTER — HOSPITAL ENCOUNTER (OUTPATIENT)
Dept: LAB | Facility: MEDICAL CENTER | Age: 61
End: 2021-08-05
Attending: FAMILY MEDICINE
Payer: COMMERCIAL

## 2021-08-05 DIAGNOSIS — S30.1XXD HEMATOMA OF RECTUS SHEATH, SUBSEQUENT ENCOUNTER: ICD-10-CM

## 2021-08-05 LAB
ERYTHROCYTE [DISTWIDTH] IN BLOOD BY AUTOMATED COUNT: 43.8 FL (ref 35.9–50)
HCT VFR BLD AUTO: 39.7 % (ref 42–52)
HGB BLD-MCNC: 13.4 G/DL (ref 14–18)
MCH RBC QN AUTO: 31.4 PG (ref 27–33)
MCHC RBC AUTO-ENTMCNC: 33.8 G/DL (ref 33.7–35.3)
MCV RBC AUTO: 93 FL (ref 81.4–97.8)
PLATELET # BLD AUTO: 331 K/UL (ref 164–446)
PMV BLD AUTO: 9.7 FL (ref 9–12.9)
RBC # BLD AUTO: 4.27 M/UL (ref 4.7–6.1)
WBC # BLD AUTO: 5.6 K/UL (ref 4.8–10.8)

## 2021-08-05 PROCEDURE — 85027 COMPLETE CBC AUTOMATED: CPT

## 2021-08-05 PROCEDURE — 36415 COLL VENOUS BLD VENIPUNCTURE: CPT

## 2021-08-10 ENCOUNTER — ANTICOAGULATION VISIT (OUTPATIENT)
Dept: VASCULAR LAB | Facility: MEDICAL CENTER | Age: 61
End: 2021-08-10
Attending: INTERNAL MEDICINE
Payer: COMMERCIAL

## 2021-08-10 VITALS — SYSTOLIC BLOOD PRESSURE: 113 MMHG | HEART RATE: 64 BPM | DIASTOLIC BLOOD PRESSURE: 76 MMHG

## 2021-08-10 DIAGNOSIS — I82.409 DEEP VEIN THROMBOSIS (DVT) OF LOWER EXTREMITY, UNSPECIFIED CHRONICITY, UNSPECIFIED LATERALITY, UNSPECIFIED VEIN (HCC): ICD-10-CM

## 2021-08-10 PROCEDURE — 99212 OFFICE O/P EST SF 10 MIN: CPT

## 2021-08-10 NOTE — PROGRESS NOTES
Target end date:TBD     Indication: DVT     Drug: Xarelto 15 mg BID (transitioning to 20 mg daily on 8/14/21)     CHADsVASC = n/a    Health Status Since Last Assessment   Pt recently admitted to hospital for rectus sheath hematoma. He was later considered stable enough to restart Xarelto upon discharge.       Adherence with DOAC Therapy   Pt is extremely compliant.     Bleeding Risk Assessment     Denies Epistaxis      Pt denies any hematuria.   Latest Hemoglobin 13.4   ETOH overuse Not mentioned     Creatinine Clearance/Renal Function     Latest ClCr > 30 mL/min    Hepatic function   Latest LFTs WNL   Pt denies any history of liver dysfunction      Drug Interactions   Platelets: >300   ASA/other antiplatelets none   NSAID none   Other drug interactions none      Examination   Blood Pressure WNL   Symptomatic hypotension Denies   Significant gait impairment/imbalance/high risk for falls? No obvious risks    Final Assessment and Recommendations:   Patient appears stable from the anticoagulation standpoint.     Benefits of continued DOAC therapy outweigh risks for this patient   Recommend pt continue with current DOAC therapy    Pt reports medication is afffordable.   With family hx of antiphospholipid, pt was scheduled f/u with vascular APRN in October as requested by Dr Bloch.      Other Actions: cmp/ cbc hemogram ordered prior to next visit    Follow up:   Will follow up with patient 2 weeks.    Diaz Pham, AnabelD

## 2021-08-17 ENCOUNTER — HOSPITAL ENCOUNTER (OUTPATIENT)
Dept: LAB | Facility: MEDICAL CENTER | Age: 61
End: 2021-08-17
Attending: FAMILY MEDICINE
Payer: COMMERCIAL

## 2021-08-17 DIAGNOSIS — Z01.84 IMMUNITY STATUS TESTING: ICD-10-CM

## 2021-08-17 PROCEDURE — 86765 RUBEOLA ANTIBODY: CPT

## 2021-08-17 PROCEDURE — 36415 COLL VENOUS BLD VENIPUNCTURE: CPT

## 2021-08-17 PROCEDURE — 86735 MUMPS ANTIBODY: CPT

## 2021-08-17 PROCEDURE — 86762 RUBELLA ANTIBODY: CPT

## 2021-08-18 ENCOUNTER — PATIENT MESSAGE (OUTPATIENT)
Dept: MEDICAL GROUP | Facility: LAB | Age: 61
End: 2021-08-18

## 2021-08-18 LAB
MEV IGG SER IA-ACNC: 5.61
MUV IGG SER IA-ACNC: 3.58
RUBV AB SER QL: >500 IU/ML

## 2021-08-23 ENCOUNTER — HOSPITAL ENCOUNTER (OUTPATIENT)
Dept: LAB | Facility: MEDICAL CENTER | Age: 61
End: 2021-08-23
Attending: NURSE PRACTITIONER
Payer: COMMERCIAL

## 2021-08-23 DIAGNOSIS — I82.409 DEEP VEIN THROMBOSIS (DVT) OF LOWER EXTREMITY, UNSPECIFIED CHRONICITY, UNSPECIFIED LATERALITY, UNSPECIFIED VEIN (HCC): ICD-10-CM

## 2021-08-23 LAB
ANION GAP SERPL CALC-SCNC: 9 MMOL/L (ref 7–16)
BUN SERPL-MCNC: 24 MG/DL (ref 8–22)
CALCIUM SERPL-MCNC: 8.9 MG/DL (ref 8.5–10.5)
CHLORIDE SERPL-SCNC: 106 MMOL/L (ref 96–112)
CO2 SERPL-SCNC: 26 MMOL/L (ref 20–33)
CREAT SERPL-MCNC: 0.86 MG/DL (ref 0.5–1.4)
ERYTHROCYTE [DISTWIDTH] IN BLOOD BY AUTOMATED COUNT: 45.2 FL (ref 35.9–50)
GLUCOSE SERPL-MCNC: 102 MG/DL (ref 65–99)
HCT VFR BLD AUTO: 43.6 % (ref 42–52)
HGB BLD-MCNC: 14.6 G/DL (ref 14–18)
MCH RBC QN AUTO: 31.3 PG (ref 27–33)
MCHC RBC AUTO-ENTMCNC: 33.5 G/DL (ref 33.7–35.3)
MCV RBC AUTO: 93.6 FL (ref 81.4–97.8)
PLATELET # BLD AUTO: 271 K/UL (ref 164–446)
PMV BLD AUTO: 9.3 FL (ref 9–12.9)
POTASSIUM SERPL-SCNC: 4.8 MMOL/L (ref 3.6–5.5)
RBC # BLD AUTO: 4.66 M/UL (ref 4.7–6.1)
SODIUM SERPL-SCNC: 141 MMOL/L (ref 135–145)
WBC # BLD AUTO: 5.1 K/UL (ref 4.8–10.8)

## 2021-08-23 PROCEDURE — 80048 BASIC METABOLIC PNL TOTAL CA: CPT

## 2021-08-23 PROCEDURE — 85027 COMPLETE CBC AUTOMATED: CPT

## 2021-08-23 PROCEDURE — 36415 COLL VENOUS BLD VENIPUNCTURE: CPT

## 2021-08-24 ENCOUNTER — ANTICOAGULATION VISIT (OUTPATIENT)
Dept: VASCULAR LAB | Facility: MEDICAL CENTER | Age: 61
End: 2021-08-24
Attending: INTERNAL MEDICINE
Payer: COMMERCIAL

## 2021-08-24 VITALS — DIASTOLIC BLOOD PRESSURE: 78 MMHG | HEART RATE: 59 BPM | SYSTOLIC BLOOD PRESSURE: 111 MMHG

## 2021-08-24 DIAGNOSIS — Z79.01 CHRONIC ANTICOAGULATION: ICD-10-CM

## 2021-08-24 PROCEDURE — 99212 OFFICE O/P EST SF 10 MIN: CPT

## 2021-08-24 NOTE — PROGRESS NOTES
Target end date:TBD.  LOT appt is on 10/13/21     Indication: DVT     Drug: Xarelto 20 mg daily.      CHADsVASC = n/a    Health Status Since Last Assessment   Patient denies any new relevant medical problems, ED visits or hospitalizations   Patient denies any embolic events (stroke/tia/systemic embolism)    Adherence with DOAC Therapy   Pt has not missed any doses in the average week    Bleeding Risk Assessment     Denies Epistaxis   Pt denies any excessive or unusual bleeding/hematomas.  Pt denies any GI bleeds or hematemesis.  Pt denies any concerning daily headache or sub dural hematoma symptoms.     Pt denies any hematuria or    Latest Hemoglobin 14.6   ETOH overuse Denies     Creatinine Clearance/Renal Function     Latest ClCr > 30 mL/min    Hepatic function   Latest LFTs WNL   Pt denies any history of liver dysfunction      Drug Interactions   Platelets: 271   ASA/other antiplatelets none   NSAID none   Other drug interactions none        Examination   Blood Pressure WNL   Symptomatic hypotension none   Significant gait impairment/imbalance/high risk for falls? No obvious risks.    BUN slightly elevated, pt will drink more water.     Final Assessment and Recommendations:   Patient appears stable from the anticoagulation standpoint.     Benefits of continued DOAC therapy outweigh risks for this patient   Recommend pt continue with current DOAC therapy      Other Actions: no new labs needed until after 3 month f/u    Follow up:   Will follow up with patient 2  Months with APRN for LOT.     Diaz Pham, AnabelD

## 2021-09-08 ENCOUNTER — OFFICE VISIT (OUTPATIENT)
Dept: MEDICAL GROUP | Facility: LAB | Age: 61
End: 2021-09-08
Payer: COMMERCIAL

## 2021-09-08 ENCOUNTER — DOCUMENTATION (OUTPATIENT)
Dept: VASCULAR LAB | Facility: MEDICAL CENTER | Age: 61
End: 2021-09-08

## 2021-09-08 VITALS
HEART RATE: 58 BPM | OXYGEN SATURATION: 94 % | SYSTOLIC BLOOD PRESSURE: 104 MMHG | BODY MASS INDEX: 25.47 KG/M2 | DIASTOLIC BLOOD PRESSURE: 68 MMHG | RESPIRATION RATE: 12 BRPM | TEMPERATURE: 97.7 F | WEIGHT: 188 LBS | HEIGHT: 72 IN

## 2021-09-08 DIAGNOSIS — H61.23 BILATERAL IMPACTED CERUMEN: ICD-10-CM

## 2021-09-08 PROCEDURE — 99213 OFFICE O/P EST LOW 20 MIN: CPT | Performed by: FAMILY MEDICINE

## 2021-09-08 ASSESSMENT — FIBROSIS 4 INDEX: FIB4 SCORE: 1.01

## 2021-09-08 NOTE — PROGRESS NOTES
Pt missed his Xarelto dose yesterday and needs advice  Sridevi Harris, PharmD  P Amb Anticoag Pool    Received the above message - Advised pt to forego his dose from last night vs doubling up.    He will get back on track w/ today's dose. Encouraged pt to set up a reminder system to avoid missing DOAC doses in the future.    Emanuel Calderon, AnabelD

## 2021-09-08 NOTE — PROGRESS NOTES
Subjective:     CC: Ears full    HPI:   Reilly presents today with concern for ears feeling full and decreased hearing.  Right has been clogged with decreased hearing for 2 days.  Mild fullness and earache as well.  Left ear with more mild symptoms.  He has history of impacted cerumen.    Medications, past medical history, allergies, and social history have been reviewed and updated.    ROS:  See HPI    Objective:       Exam:  /68 (BP Location: Left arm, Patient Position: Sitting, BP Cuff Size: Adult)   Pulse (!) 58   Temp 36.5 °C (97.7 °F)   Resp 12   Ht 1.829 m (6')   Wt 85.3 kg (188 lb)   SpO2 94%   BMI 25.50 kg/m²  Body mass index is 25.5 kg/m².    Constitutional: Alert. Well appearing. No distress.  Skin: Warm, dry, good turgor, no visible rashes.  Eye: Equal, round and reactive to light, conjunctiva clear, lids normal.  HEENT: Bilateral impacted cerumen.  Respiratory: Normal effort.  Neuro: Moves all four extremities. No facial droop.  Psych: Answers questions appropriately. Normal affect and mood.    Assessment & Plan:     61 y.o. male with the following -     1. Bilateral impacted cerumen  Resolved with bilateral ear canal irrigation today.  Discussed preventative measures including avoiding Q-tips, eardrops at home, gentle irrigation at home.      Please note that this note was created using voice recognition software.

## 2021-10-05 ENCOUNTER — APPOINTMENT (OUTPATIENT)
Dept: MEDICAL GROUP | Facility: LAB | Age: 61
End: 2021-10-05
Payer: COMMERCIAL

## 2021-10-13 ENCOUNTER — OFFICE VISIT (OUTPATIENT)
Dept: VASCULAR LAB | Facility: MEDICAL CENTER | Age: 61
End: 2021-10-13
Attending: INTERNAL MEDICINE
Payer: COMMERCIAL

## 2021-10-13 VITALS
HEART RATE: 67 BPM | WEIGHT: 191 LBS | HEIGHT: 72 IN | SYSTOLIC BLOOD PRESSURE: 115 MMHG | DIASTOLIC BLOOD PRESSURE: 70 MMHG | BODY MASS INDEX: 25.87 KG/M2

## 2021-10-13 DIAGNOSIS — I82.409 DEEP VEIN THROMBOSIS (DVT) OF LOWER EXTREMITY, UNSPECIFIED CHRONICITY, UNSPECIFIED LATERALITY, UNSPECIFIED VEIN (HCC): ICD-10-CM

## 2021-10-13 DIAGNOSIS — S30.1XXA HEMATOMA OF RECTUS SHEATH, INITIAL ENCOUNTER: ICD-10-CM

## 2021-10-13 DIAGNOSIS — D68.59 HYPERCOAGULABLE STATE (HCC): ICD-10-CM

## 2021-10-13 PROCEDURE — 99212 OFFICE O/P EST SF 10 MIN: CPT

## 2021-10-13 PROCEDURE — 99215 OFFICE O/P EST HI 40 MIN: CPT | Performed by: NURSE PRACTITIONER

## 2021-10-13 ASSESSMENT — FIBROSIS 4 INDEX: FIB4 SCORE: 1.01

## 2021-10-13 NOTE — PROGRESS NOTES
Initial VASCULAR ANTI-COAGULATION VISIT  Subjective     Reilly Swartz is a 61 y.o. male who presents today 10/13/2021 for   Referred for length of therapy (LOT) determination of anticoagulation in context of acute venothromboembolic disease    HPI:    VTE disease / Anticoagulation:   Current symptoms:  Denies current SOB, CP, leg swelling, edema, leg pain, cyanosis of digits, redness of extremities.  Current antithrombotic agent: Xarelto   Complications: rectus sheath hematoma, noted after abdominal workout, one week after starting Xarelto 15mg BID for LLE popliteal DVT  Date of initiation of anticoagulation: 7/21/2021  Adherence: reports complete, no missed doses    _____Pertinent VTE pmhx:   Date of Diagnosis: 7/21/2021  Type of Venous thromboembolic disease (VTE): LLE DVT in popliteal vein extending distally into calf veins   Preceding/presenting symptoms: LLE leg pain and calf for 2 days   Antithrombotic therapy at time of VTE event: no  Any personal VTE hx? No  Any family VTE hx? No, family history of APS   _____UNPROVOKED VS PROVOKED:   Recent surgery ? No  Recent trauma ? No  Smoker?  reports that he has never smoked. He has never used smokeless tobacco.    Extended travel? No  Other periods of immobility? Less active Details: less active lifestyle, reports slightly more sedentary since working remotely  Other known or potential risk factors for VTE disease:  yes - family history in his brother and half sister for APS; anticardiolipin antibodies negative  MEN:  Hx of cancer or abnormal cancer screenings: Never has had a colonoscopy   Hx of Testosterone therapy: no  Hypercoaguability work-up completed?  yes - partial (see assessment for details)    Social History     Tobacco Use   • Smoking status: Never Smoker   • Smokeless tobacco: Never Used   Vaping Use   • Vaping Use: Never used   Substance Use Topics   • Alcohol use: Not Currently   • Drug use: Never     DIET AND EXERCISE:  Weight Change: reports  gain in 20 lbs   Diet: common adult  Exercise: moderate regular exercise program    Objective     Vitals:    10/13/21 0845   BP: 115/70   BP Location: Left arm   Patient Position: Sitting   BP Cuff Size: Adult   Pulse: 67   Weight: 86.6 kg (191 lb)   Height: 1.829 m (6')     Body mass index is 25.9 kg/m².  Physical Exam  Lab Results   Component Value Date    CHOLSTRLTOT 138 08/03/2021    LDL 88 08/03/2021    HDL 36 (A) 08/03/2021    TRIGLYCERIDE 69 08/03/2021      Lab Results   Component Value Date    PROTHROMBTM 20.6 (H) 07/28/2021    INR 1.92 (H) 07/28/2021       Lab Results   Component Value Date    HBA1C 5.4 07/29/2021      Lab Results   Component Value Date    SODIUM 141 08/23/2021    POTASSIUM 4.8 08/23/2021    CHLORIDE 106 08/23/2021    CO2 26 08/23/2021    GLUCOSE 102 (H) 08/23/2021    BUN 24 (H) 08/23/2021    CREATININE 0.86 08/23/2021        Lab Results   Component Value Date    WBC 5.1 08/23/2021    RBC 4.66 (L) 08/23/2021    HEMOGLOBIN 14.6 08/23/2021    HEMATOCRIT 43.6 08/23/2021    MCV 93.6 08/23/2021    MCH 31.3 08/23/2021    MCHC 33.5 (L) 08/23/2021    MPV 9.3 08/23/2021      1. Deep vein thrombosis (DVT) of lower extremity, unspecified chronicity, unspecified laterality, unspecified vein (HCC)  rivaroxaban (XARELTO) 20 MG Tab tablet    D-DIMER    FACTOR V LEIDEN MUTATION    FACTOR II DNA ANALYSIS    BETA-2 GLYCOPROTEIN I AB,G,A,M    US-EXTREMITY VENOUS LOWER UNILAT LEFT   2. Hypercoagulable state (HCC)  CBC WITH DIFFERENTIAL    Basic Metabolic Panel   3. Hematoma of rectus sheath, initial encounter  CBC WITH DIFFERENTIAL    Basic Metabolic Panel        Medical Decision Making: Today's Assessment / Status / Plan:      Indication for anticoagulation: apparent unprovoked LLE popliteal DVT    HAS BLED score: 1, abdominal hematoma 1 week after starting Xarelto 15mg BID  Lowest H/H 12.8/37.9, no transfusion or drainage required, medical management only     7/28/2021: Held Xarelto for 2 days in hospital  then resumed without any further bleeding problems     Anti-Platelet/Anti-Coagulant Tx: yes    Anti-Coagulation Plan:  Discussed risks and benefits of extended anticoagulation in current setting.  Explained that although he did have a bleeding complication, he was on high dose Xarelto and now has been tolerating Xarelto 20mg without any further bleeding problems.  Recommended for extended anticoagulation with Xarleto due to a 30% ris of recurrent DVT over the next 5 years off anticoag.  Will continue to monitor her for s/s of bleeding due to her risk of 3.4% estimated annually.  Recommended completing partial hypercoag panel which can be done while on AC.  Anticardiolipid antibodies were negative when drawn in hospital.  Through shared decision making we have opted to continue Xarelto 20mg, and discuss further options of decreasing dose vs transition to aspirin vs extended AC after 6 months of therapy.      Pt ok to proceed with plans for colonoscopy after 3 full months on AC which will be after Oct 21, 2021.  Contact our office for instructions on holding AC for procedure.  - Continue Xarelto 20mg at least through 1/21/2022 (6 months) at which time will discuss extended AC as above.  - Repeat LLE duplex   - Monitor closely for any s/s of bleeding and report immediately if occur.   - Recheck Q 6 months CBC, BMP also ordered partial hypercoag panel   - Avoid sedentary periods  - Avoid high impact or risk of head injury activities while on anticoagulation  - Defer any indicated work up for occult malignancy to PCP     Smoking: continue complete cessation     Physical Activity: frequency : goal is  3-4 times a week; encouraged him to resume his usual exercise routine    Weight Management and Nutrition:exercise counseling and nutrition counseling    Instructed to follow-up with PCP for remainder of adult medical needs: yes  We will partner with other provider in the management of established vascular disease and  cardiometabolic risk factors    Studies to Be Obtained: None  Labs to Be Obtained: as ordered above     Follow up in: 3 months    Lachelle Quinones, A.P.NMick    Total time: 45min - chart review/prep, review of other providers' records, imaging/lab review, face-to-face time for history/examination, ordering, prescribing,  review of results/meds/ treatment plan with patient/family/caregiver, documentation in EMR, care coordination (as needed)    CC:   Nir Hidalgo M.D.

## 2021-10-27 ENCOUNTER — OFFICE VISIT (OUTPATIENT)
Dept: MEDICAL GROUP | Facility: LAB | Age: 61
End: 2021-10-27
Payer: COMMERCIAL

## 2021-10-27 VITALS
SYSTOLIC BLOOD PRESSURE: 120 MMHG | BODY MASS INDEX: 24.57 KG/M2 | HEIGHT: 72 IN | HEART RATE: 62 BPM | TEMPERATURE: 97.8 F | OXYGEN SATURATION: 96 % | WEIGHT: 181.4 LBS | DIASTOLIC BLOOD PRESSURE: 60 MMHG | RESPIRATION RATE: 12 BRPM

## 2021-10-27 DIAGNOSIS — I82.409 DEEP VEIN THROMBOSIS (DVT) OF LOWER EXTREMITY, UNSPECIFIED CHRONICITY, UNSPECIFIED LATERALITY, UNSPECIFIED VEIN (HCC): ICD-10-CM

## 2021-10-27 DIAGNOSIS — M25.561 ACUTE PAIN OF RIGHT KNEE: ICD-10-CM

## 2021-10-27 PROCEDURE — 99213 OFFICE O/P EST LOW 20 MIN: CPT | Performed by: NURSE PRACTITIONER

## 2021-10-27 ASSESSMENT — FIBROSIS 4 INDEX: FIB4 SCORE: 1.01

## 2021-10-27 NOTE — PROGRESS NOTES
Subjective:     CC: The encounter diagnosis was Acute pain of right knee.    HPI:   Reilly presents today with the following:    Right knee pain  Patient was put on Xarelto because of a DVT in his left leg at the end of July. He is an avid runner and since he started the Xarelto he has not been running. He recently started running again at the same frequency and pace as before his DVT and almost immediately had pain in his right knee. He reports that the pain and swelling started about 3 weeks ago. Patient reports that both of his legs are swollen, but the right knee is worse. He also reports a loss of ROM with flexion of his knee and pain with walking, but he is able to bear weight. He has tried ice and stretching.    Current Outpatient Medications Ordered in Epic   Medication Sig Dispense Refill   • rivaroxaban (XARELTO) 20 MG Tab tablet Take 1 Tablet by mouth with dinner. 90 Tablet 3     No current Epic-ordered facility-administered medications on file.     ROS:   Gen: no fevers/chills, no changes in weight  Eyes: no changes in vision  ENT: no sore throat, no hearing loss, no bloody nose  Pulm: no sob, no cough  CV: no chest pain, no palpitations  GI: no nausea/vomiting, no diarrhea  : no dysuria  Skin: no rash  Neuro: no headaches, no numbness/tingling  Heme/Lymph: no easy bruising        - NOTE: All other systems reviewed and are negative, except as in HPI.    Objective:     Exam: /60   Pulse 62   Temp 36.6 °C (97.8 °F)   Resp 12   Ht 1.829 m (6')   Wt 82.3 kg (181 lb 6.4 oz)   SpO2 96%  Body mass index is 24.6 kg/m².    General: Normal appearing. No distress.  Neck: Supple without JVD or bruit. Thyroid is not enlarged.  Pulmonary: Clear to ausculation.  Normal effort. No rales, ronchi, or wheezing.  Cardiovascular: Regular rate and rhythm without murmur. Carotid and radial pulses are intact and equal bilaterally.  Neurologic: Grossly nonfocal  Lymph: No cervical or supraclavicular lymph nodes are  palpable  Skin: Warm and dry.  No obvious lesions.  Musculoskeletal: Normal gait. No extremity cyanosis, clubbing, or edema.  Right Knee: ROM decreased with flexion more than 90 degrees, full strength, TTP over medial aspect of knee, edema present surrounding patella, varus/valgus stress negative, anterior/posterior drawer negative, Lachman's negative  Psych: Normal mood and affect. Alert and oriented x3. Judgment and insight is normal.    Assessment & Plan:     61 y.o. male with the following -     1. Acute pain of right knee  X-ray ordered. Referral sent to sports medicine. Discussed knee pain management options including joint protection, non-offending activities such as cycling or swimming, knee bracing.   - DX-KNEE COMPLETE 4+ RIGHT; Future  - REFERRAL TO SPORTS MEDICINE    Return if symptoms worsen or fail to improve.    Please note that this dictation was created using voice recognition software. I have made every reasonable attempt to correct obvious errors, but I expect that there are errors of grammar and possibly content that I did not discover before finalizing the note.

## 2021-10-28 ENCOUNTER — HOSPITAL ENCOUNTER (OUTPATIENT)
Dept: RADIOLOGY | Facility: MEDICAL CENTER | Age: 61
End: 2021-10-28
Attending: NURSE PRACTITIONER
Payer: COMMERCIAL

## 2021-10-28 DIAGNOSIS — M25.561 ACUTE PAIN OF RIGHT KNEE: ICD-10-CM

## 2021-10-28 PROCEDURE — 73564 X-RAY EXAM KNEE 4 OR MORE: CPT | Mod: RT

## 2021-11-01 ENCOUNTER — HOSPITAL ENCOUNTER (OUTPATIENT)
Dept: RADIOLOGY | Facility: MEDICAL CENTER | Age: 61
End: 2021-11-01
Attending: NURSE PRACTITIONER
Payer: COMMERCIAL

## 2021-11-01 DIAGNOSIS — I82.409 DEEP VEIN THROMBOSIS (DVT) OF LOWER EXTREMITY, UNSPECIFIED CHRONICITY, UNSPECIFIED LATERALITY, UNSPECIFIED VEIN (HCC): ICD-10-CM

## 2021-11-01 PROCEDURE — 93970 EXTREMITY STUDY: CPT

## 2021-11-09 ENCOUNTER — OFFICE VISIT (OUTPATIENT)
Dept: SPORTS MEDICINE | Facility: CLINIC | Age: 61
End: 2021-11-09
Payer: COMMERCIAL

## 2021-11-09 VITALS
OXYGEN SATURATION: 95 % | HEART RATE: 78 BPM | HEIGHT: 72 IN | WEIGHT: 181.4 LBS | TEMPERATURE: 97.8 F | BODY MASS INDEX: 24.57 KG/M2 | DIASTOLIC BLOOD PRESSURE: 70 MMHG | SYSTOLIC BLOOD PRESSURE: 108 MMHG | RESPIRATION RATE: 18 BRPM

## 2021-11-09 DIAGNOSIS — Z79.01 ON CONTINUOUS ORAL ANTICOAGULATION: ICD-10-CM

## 2021-11-09 DIAGNOSIS — M17.11 LOCALIZED OSTEOARTHRITIS OF RIGHT KNEE: ICD-10-CM

## 2021-11-09 DIAGNOSIS — M71.21 BAKER'S CYST OF KNEE, RIGHT: ICD-10-CM

## 2021-11-09 PROCEDURE — 99213 OFFICE O/P EST LOW 20 MIN: CPT | Performed by: FAMILY MEDICINE

## 2021-11-09 ASSESSMENT — FIBROSIS 4 INDEX: FIB4 SCORE: 1.01

## 2021-11-09 NOTE — PROGRESS NOTES
CHIEF COMPLAINT:  Reilly Swartz male presenting at the request of SHARIF De La Vega for evaluation of knee pain.     Reilly Swartz is complaining of right knee pain  present for 4 weeks  Pain is at the posterior knee along medial hamstring region  RIGHT leg became swollen without notable cause  Lacking RIGHT knee flexion  Quality is sharp initially, dull  Pain is non-radiating but extends just past knee and above as well  Improved with heat and Epson salt  Aggravated by full knee extension  prior history of stress fracture in the RIGHT knee with running several years ago  Prior Treatments: seen by PCP office  Prior studies: X-Ray   Medications tried for pain include: none  Mechanical Symptom history: No Locking, clicking or popping  Had DVT and became more sedentary  Had GI issues on Xarelto and was worked up to continue  Last week, since having his new onset swelling in the RIGHT leg he did undergo ultrasound scan for evaluation of DVT which was NEGATIVE.     Like running, stationary cycling  , family law in [place]    REVIEW OF SYSTEMS  No Nausea, No Vomiting, No Chest Pain, No Shortness of Breath, No Dizziness, No Headache    PAST MEDICAL HISTORY:   History reviewed. No pertinent past medical history.    PMH:  has no past medical history on file.  MEDS:   Current Outpatient Medications:   •  rivaroxaban (XARELTO) 20 MG Tab tablet, Take 1 Tablet by mouth with dinner., Disp: 90 Tablet, Rfl: 3  ALLERGIES: No Known Allergies  SURGHX:   Past Surgical History:   Procedure Laterality Date   • OTHER ABDOMINAL SURGERY      inguinal hernia repair 2008     SOCHX:  reports that he has never smoked. He has never used smokeless tobacco. He reports previous alcohol use. He reports that he does not use drugs.  FH: Family history was reviewed, no pertinent findings to report     PHYSICAL EXAM:  /70 (BP Location: Left arm, Patient Position: Sitting, BP Cuff Size: Adult)   Pulse 78   Temp 36.6 °C (97.8 °F)  (Temporal)   Resp 18   Ht 1.829 m (6')   Wt 82.3 kg (181 lb 6.4 oz)   SpO2 95%   BMI 24.60 kg/m²      well-developed, well-nourished in no apparent distress, alert and oriented x 3.  Gait: Minimally antalgic, difficulty with full RIGHT knee extension during gait     RIGHT Knee:  Slight Varus and Swelling   Range of Motion Slightly limited with Flexion and Slightly limited with Extension  2+ effusion  Patellar No tenderness and no apprehension  Medial Joint Line Tenderness and NEGATIVE Andree  Lateral Joint Line Non-tender and NEGATIVE Andree  Trace Laxity with Varus stress  Trace Laxity with Valgus stress  Lachman's testing is Trace  Posterior Drawer Testing is Trace  The leg is otherwise neurovascularly intact    LEFT Knee:  Slight Varus and No Swelling   Range of Motion Intact  Trace effusion  Patellar No tenderness and no apprehension  Medial Joint Line Non-tender and NEGATIVE Andree  Lateral Joint Line Non-tender and NEGATIVE Andree  Trace Laxity with Varus stress  Trace Laxity with Valgus stress  Lachman's testing is Trace  Posterior Drawer Testing is Trace  The leg is otherwise neurovascularly intact    Additional Findings: None      1. Baker's cyst of knee, right     2. Localized osteoarthritis of right knee     3. On continuous oral anticoagulation (prior LEFT leg DVT likely due to extended desk work)       present for 4 weeks  Pain is at the posterior knee along medial hamstring region  RIGHT leg became swollen without notable cause    Discussed knee osteoarthritis management options includin.  Joint protection  2.  Non-offending activities such as cycling   3.  Knee bracing, brace fitted today  4. Injection options including corticosteroid which we can consider if symptoms persist    Return in about 4 weeks (around 2021).   To see how he is doing with his knee brace, home exercise program and returning to running/cycling        10/28/2021 8:38 AM     HISTORY/REASON FOR EXAM:   Atraumatic medial right knee pain with intermittent swelling.        TECHNIQUE/EXAM DESCRIPTION AND NUMBER OF VIEWS:  4 views of the RIGHT knee.     COMPARISON: None     FINDINGS:     The alignment of the knee is within normal limits.  There is no joint effusion.  There is no evidence of displaced fracture or dislocation.  No degenerative or erosive arthritic change is present.  There is minor spurring of the anterior tibial tubercle.  There is no focal swelling.        IMPRESSION:     No right knee fracture, dislocation, or joint effusion.             Exam Ended: 10/28/21  8:54 AM Last Resulted: 10/28/21  1:00 PM           done elsewhere and reviewed independently by me      11/1/2021 10:57 AM     HISTORY/REASON FOR EXAM:  Pain in Limb with Pressure  Lower extremity swelling     TECHNIQUE/EXAM DESCRIPTION:  Using both color and pulsed-wave Doppler imaging, multiple images were obtained of the bilateral lower extremities from the common femoral vein origins distally through the popliteal trifurcations.  Graded compression was used to demonstrate patent   lumens.     COMPARISON:  None.     FINDINGS:     REAL-TIME GRAY-SCALE IMAGING:  Real-time gray-scale imaging reveals no evidence of focal wall thickening.     COLOR AND DUPLEX DOPPLER IMAGING:  There is no evidence of luminal thrombus.  There is normal augmentation to flow and respiratory variation.     There is a 4.5 x 3.0 x 1.3 cm fluid collection within the right popliteal soft tissues consistent with Baker's cyst.     There is a 2.7 x 1.9 x 0.8 cm normal-appearing right groin lymph node.     IMPRESSION:     1.  No evidence of bilateral lower extremity deep venous thrombosis.     2.  1.3 x 4.5 cm fluid collection within the right popliteal soft tissues consistent with Baker's cyst.             Exam Ended: 11/01/21 11:37 AM Last Resulted: 11/01/21  1:20 PM           Thank you SHARIF De La Vega for allowing me to participate in care of your patient.

## 2021-11-09 NOTE — Clinical Note
Lars Mejias,  Thank you for referring Reilly to our sports medicine clinic.  It appears he has an osteoarthritis flare in the RIGHT knee resulting in a Baker's cyst.  We discussed treatment options and fitted him with a functional knee brace today.  We will see him back in 1 month to see how things are coming along.  Hope you are well!  L

## 2021-11-19 ENCOUNTER — OFFICE VISIT (OUTPATIENT)
Dept: MEDICAL GROUP | Facility: LAB | Age: 61
End: 2021-11-19
Payer: COMMERCIAL

## 2021-11-19 VITALS
RESPIRATION RATE: 14 BRPM | WEIGHT: 179 LBS | TEMPERATURE: 97.1 F | HEIGHT: 72 IN | HEART RATE: 54 BPM | SYSTOLIC BLOOD PRESSURE: 100 MMHG | BODY MASS INDEX: 24.24 KG/M2 | DIASTOLIC BLOOD PRESSURE: 66 MMHG | OXYGEN SATURATION: 98 %

## 2021-11-19 DIAGNOSIS — L73.8 BACTERIAL FOLLICULITIS: ICD-10-CM

## 2021-11-19 PROCEDURE — 99213 OFFICE O/P EST LOW 20 MIN: CPT | Performed by: NURSE PRACTITIONER

## 2021-11-19 ASSESSMENT — FIBROSIS 4 INDEX: FIB4 SCORE: 1.01

## 2021-11-19 NOTE — PROGRESS NOTES
Subjective:     CC: The encounter diagnosis was Bacterial folliculitis.    HPI:   Reilly presents today with the following:    Ingrown hair  Onset 1.5 weeks ago. Patient reports that he noticed a red area on his torso. He has been using iodine to clean it. At first it looked to be worsening with redness and swelling around the wound, but it has since improved. He is still concerned about infection. Denies fever, chills, N/V, drainage, injury.     ROS:   As documented in history of present illness above    Objective:     Exam: /66 (BP Location: Right arm, Patient Position: Sitting, BP Cuff Size: Adult)   Pulse (!) 54   Temp 36.2 °C (97.1 °F)   Resp 14   Ht 1.829 m (6')   Wt 81.2 kg (179 lb)   SpO2 98%  Body mass index is 24.28 kg/m².    General: Normal appearing. No distress.  Neck: Supple without JVD or bruit. Thyroid is not enlarged.  Pulmonary: Clear to ausculation.  Normal effort. No rales, ronchi, or wheezing.  Cardiovascular: Regular rate and rhythm without murmur. Carotid and radial pulses are intact and equal bilaterally.  Abdomen: Soft, nontender, nondistended. Normal bowel sounds. Liver and spleen are not palpable  Neurologic: Grossly nonfocal  Lymph: No cervical or supraclavicular lymph nodes are palpable  Skin: Warm and dry.  1.5 cm nodule with central hair with surrounding erythema on left torso.   Musculoskeletal: Normal gait. No extremity cyanosis, clubbing, or edema.  Psych: Normal mood and affect. Alert and oriented x3. Judgment and insight is normal.    Assessment & Plan:     61 y.o. male with the following -     1. Bacterial folliculitis  Patient to use medication as prescribed. Patient to use unscented gentle cleanser such as Dove Unscented or Cetaphil when bathing. Supportive care, differential diagnoses, and indications for immediate follow-up discussed with patient. Pathogenesis of diagnosis discussed including typical length and natural progression. Instructed to return to clinic for  any change in condition, further concerns, or worsening of symptoms.  - mupirocin (BACTROBAN) 2 % Ointment; Apply 1 Application topically 2 times a day for 7 days.  Dispense: 15 g; Refill: 0

## 2021-12-06 ENCOUNTER — DOCUMENTATION (OUTPATIENT)
Dept: VASCULAR LAB | Facility: MEDICAL CENTER | Age: 61
End: 2021-12-06

## 2021-12-06 ENCOUNTER — PATIENT MESSAGE (OUTPATIENT)
Dept: VASCULAR LAB | Facility: MEDICAL CENTER | Age: 61
End: 2021-12-06

## 2021-12-06 NOTE — PROGRESS NOTES
Cockcroft & Gault (Actual body weight): 103.3 (mL/min)       Per the 2017 ACC Expert Consensus Decision Pathway for Periprocedural Management of Anticoagulation (see document below) - it would be recommended to have pt hold 48 hrs prior to the procedure. REEMA Stewart.

## 2022-02-03 ENCOUNTER — DOCUMENTATION (OUTPATIENT)
Dept: VASCULAR LAB | Facility: MEDICAL CENTER | Age: 62
End: 2022-02-03

## 2022-02-03 NOTE — PROGRESS NOTES
Reilly Swartz  No showed to follow-up with the vascular medicine clinic. Scheduling staff will contact to reschedule and remind patient of the importance of maintaining appointments as scheduled or to contact our office at least 24 hours in advance if they need to reschedule.     Patient should be aware of the potential for worsening conditions without appropriate follow-up and monitoring.      Vascular Medicine Clinic   [facility]   [phone]

## 2022-11-18 ENCOUNTER — TELEPHONE (OUTPATIENT)
Dept: VASCULAR LAB | Facility: MEDICAL CENTER | Age: 62
End: 2022-11-18
Payer: COMMERCIAL

## 2022-11-18 NOTE — TELEPHONE ENCOUNTER
[facility]    Left Vm with pt explaining the Xarelto Rx was refilled for 30 days as a courtesy.  He will need an appointment scheduled and will need to keep his appointment for further refills in the future.     Diaz Pham, PharmD

## 2022-12-05 ENCOUNTER — TELEPHONE (OUTPATIENT)
Dept: VASCULAR LAB | Facility: MEDICAL CENTER | Age: 62
End: 2022-12-05
Payer: COMMERCIAL

## 2022-12-05 DIAGNOSIS — I82.409 DEEP VEIN THROMBOSIS (DVT) OF LOWER EXTREMITY, UNSPECIFIED CHRONICITY, UNSPECIFIED LATERALITY, UNSPECIFIED VEIN (HCC): ICD-10-CM

## 2022-12-05 NOTE — TELEPHONE ENCOUNTER
Rescheduled pt's appt with Dr Brady in January 2023    Sent refills for Xarelto to get him through    Sridevi Salinas, AnabelD

## 2022-12-27 NOTE — PROGRESS NOTES
4 Eyes Skin Assessment Completed by SUKH Hatch and Baldo RN.    Head WDL  Ears WDL  Nose WDL  Mouth WDL  Neck WDL  Breast/Chest WDL  Shoulder Blades WDL  Spine WDL  (R) Arm/Elbow/Hand WDL  (L) Arm/Elbow/Hand Edema  Abdomen WDL  Groin WDL  Scrotum/Coccyx/Buttocks WDL  (R) Leg WDL  (L) Leg WDL  (R) Heel/Foot/Toe WDL  (L) Heel/Foot/Toe WDL          Devices In Places Blood Pressure Cuff      Interventions In Place N/A    Possible Skin Injury No    Pictures Uploaded Into Epic N/A  Wound Consult Placed N/A  RN Wound Prevention Protocol Ordered No    Attending Attestation (For Attendings USE Only)...

## 2023-01-03 ENCOUNTER — APPOINTMENT (OUTPATIENT)
Dept: MEDICAL GROUP | Facility: LAB | Age: 63
End: 2023-01-03
Payer: COMMERCIAL

## 2023-01-06 ENCOUNTER — OFFICE VISIT (OUTPATIENT)
Dept: MEDICAL GROUP | Facility: LAB | Age: 63
End: 2023-01-06
Payer: COMMERCIAL

## 2023-01-06 VITALS
BODY MASS INDEX: 23.57 KG/M2 | SYSTOLIC BLOOD PRESSURE: 104 MMHG | OXYGEN SATURATION: 95 % | RESPIRATION RATE: 12 BRPM | DIASTOLIC BLOOD PRESSURE: 80 MMHG | TEMPERATURE: 97 F | HEART RATE: 68 BPM | HEIGHT: 72 IN | WEIGHT: 174 LBS

## 2023-01-06 DIAGNOSIS — I82.432 ACUTE DEEP VEIN THROMBOSIS (DVT) OF POPLITEAL VEIN OF LEFT LOWER EXTREMITY (HCC): ICD-10-CM

## 2023-01-06 DIAGNOSIS — Z13.6 SCREENING FOR CARDIOVASCULAR CONDITION: ICD-10-CM

## 2023-01-06 DIAGNOSIS — L72.0 EPIDERMAL CYST OF NECK: ICD-10-CM

## 2023-01-06 PROCEDURE — 99214 OFFICE O/P EST MOD 30 MIN: CPT | Performed by: FAMILY MEDICINE

## 2023-01-06 ASSESSMENT — FIBROSIS 4 INDEX: FIB4 SCORE: 1.02

## 2023-01-06 NOTE — PROGRESS NOTES
Subjective:     CC: Cyst on neck, labs    HPI:   Reilly presents today with concern for cyst on right upper neck and for lab work.    He has had nonpainful cyst to the posterior right upper neck/right occipital area for many years.  Stable and unchanging.  Previously seen by urgent care and they recommended ENT for considering excision but he did not end up following up.  More people are noticing so he would like to consider treatment for this.    Has follow-up scheduled with vascular medicine for history of DVT.  He had an unprovoked left popliteal DVT, has been maintained on Xarelto.  Does have a family history of antiphospholipid syndrome but has had negative hypercoagulability testing.    Medications, past medical history, allergies, and social history have been reviewed and updated.      Objective:       Exam:  /80 (BP Location: Left arm, Patient Position: Sitting, BP Cuff Size: Adult)   Pulse 68   Temp 36.1 °C (97 °F)   Resp 12   Ht 1.829 m (6')   Wt 78.9 kg (174 lb)   SpO2 95%   BMI 23.60 kg/m²  Body mass index is 23.6 kg/m².    Constitutional: Alert. Well appearing. No distress.  Skin: Warm, dry, good turgor, no visible rashes.  Eye: Equal, round and reactive to light, conjunctiva clear, lids normal.  Neck: To the right posterior neck/right occipital area there is a ~ 2.5 cm soft, mobile, nontender, rubbery subcutaneous nodule.  Respiratory: Normal effort. Lungs are clear to auscultation bilaterally.  Cardiovascular: Regular rate and rhythm. Normal S1/S2. No murmurs, rubs or gallops.   Neuro: Moves all four extremities. No facial droop.  Psych: Answers questions appropriately. Normal affect and mood.    Assessment & Plan:     62 y.o. male with the following -     1. Epidermal cyst of neck  To the right posterior neck/right occipital area there is a ~ 2.5 cm soft, mobile, nontender, rubbery subcutaneous nodule.  He would like to consider removal, referral sent to ENT.  - Referral to ENT    2. Acute  deep vein thrombosis (DVT) of popliteal vein of left lower extremity (HCC)  He had an unprovoked left popliteal DVT, has been maintained on Xarelto.  Does have a family history of antiphospholipid syndrome but has had negative hypercoagulability testing.  Labs as below, added beta-2 as he did not have this drawn when previously ordered by anticoagulation clinic.  He has follow-up with vascular medicine for further discussion on duration of anticoagulation.  - CBC WITH DIFFERENTIAL; Future  - Comp Metabolic Panel; Future  - BETA-2 GLYCOPROTEIN I AB,G,A,M    3. Screening for cardiovascular condition  - Lipid Profile; Future      Please note that this note was created using voice recognition software.

## 2023-01-07 ENCOUNTER — HOSPITAL ENCOUNTER (OUTPATIENT)
Dept: LAB | Facility: MEDICAL CENTER | Age: 63
End: 2023-01-07
Attending: FAMILY MEDICINE
Payer: COMMERCIAL

## 2023-01-07 DIAGNOSIS — I82.432 ACUTE DEEP VEIN THROMBOSIS (DVT) OF POPLITEAL VEIN OF LEFT LOWER EXTREMITY (HCC): ICD-10-CM

## 2023-01-07 DIAGNOSIS — Z13.6 SCREENING FOR CARDIOVASCULAR CONDITION: ICD-10-CM

## 2023-01-07 LAB
ALBUMIN SERPL BCP-MCNC: 4.1 G/DL (ref 3.2–4.9)
ALBUMIN/GLOB SERPL: 1.8 G/DL
ALP SERPL-CCNC: 57 U/L (ref 30–99)
ALT SERPL-CCNC: 30 U/L (ref 2–50)
ANION GAP SERPL CALC-SCNC: 9 MMOL/L (ref 7–16)
AST SERPL-CCNC: 28 U/L (ref 12–45)
BASOPHILS # BLD AUTO: 1.2 % (ref 0–1.8)
BASOPHILS # BLD: 0.06 K/UL (ref 0–0.12)
BILIRUB SERPL-MCNC: 0.4 MG/DL (ref 0.1–1.5)
BUN SERPL-MCNC: 23 MG/DL (ref 8–22)
CALCIUM ALBUM COR SERPL-MCNC: 9.1 MG/DL (ref 8.5–10.5)
CALCIUM SERPL-MCNC: 9.2 MG/DL (ref 8.5–10.5)
CHLORIDE SERPL-SCNC: 106 MMOL/L (ref 96–112)
CHOLEST SERPL-MCNC: 156 MG/DL (ref 100–199)
CO2 SERPL-SCNC: 26 MMOL/L (ref 20–33)
CREAT SERPL-MCNC: 0.73 MG/DL (ref 0.5–1.4)
EOSINOPHIL # BLD AUTO: 0.55 K/UL (ref 0–0.51)
EOSINOPHIL NFR BLD: 10.9 % (ref 0–6.9)
ERYTHROCYTE [DISTWIDTH] IN BLOOD BY AUTOMATED COUNT: 45.5 FL (ref 35.9–50)
FASTING STATUS PATIENT QL REPORTED: NORMAL
GFR SERPLBLD CREATININE-BSD FMLA CKD-EPI: 102 ML/MIN/1.73 M 2
GLOBULIN SER CALC-MCNC: 2.3 G/DL (ref 1.9–3.5)
GLUCOSE SERPL-MCNC: 87 MG/DL (ref 65–99)
HCT VFR BLD AUTO: 43.2 % (ref 42–52)
HDLC SERPL-MCNC: 46 MG/DL
HGB BLD-MCNC: 14.4 G/DL (ref 14–18)
IMM GRANULOCYTES # BLD AUTO: 0.02 K/UL (ref 0–0.11)
IMM GRANULOCYTES NFR BLD AUTO: 0.4 % (ref 0–0.9)
LDLC SERPL CALC-MCNC: 98 MG/DL
LYMPHOCYTES # BLD AUTO: 1.53 K/UL (ref 1–4.8)
LYMPHOCYTES NFR BLD: 30.3 % (ref 22–41)
MCH RBC QN AUTO: 31.1 PG (ref 27–33)
MCHC RBC AUTO-ENTMCNC: 33.3 G/DL (ref 33.7–35.3)
MCV RBC AUTO: 93.3 FL (ref 81.4–97.8)
MONOCYTES # BLD AUTO: 0.42 K/UL (ref 0–0.85)
MONOCYTES NFR BLD AUTO: 8.3 % (ref 0–13.4)
NEUTROPHILS # BLD AUTO: 2.47 K/UL (ref 1.82–7.42)
NEUTROPHILS NFR BLD: 48.9 % (ref 44–72)
NRBC # BLD AUTO: 0 K/UL
NRBC BLD-RTO: 0 /100 WBC
PLATELET # BLD AUTO: 255 K/UL (ref 164–446)
PMV BLD AUTO: 10.1 FL (ref 9–12.9)
POTASSIUM SERPL-SCNC: 4.4 MMOL/L (ref 3.6–5.5)
PROT SERPL-MCNC: 6.4 G/DL (ref 6–8.2)
RBC # BLD AUTO: 4.63 M/UL (ref 4.7–6.1)
SODIUM SERPL-SCNC: 141 MMOL/L (ref 135–145)
TRIGL SERPL-MCNC: 58 MG/DL (ref 0–149)
WBC # BLD AUTO: 5.1 K/UL (ref 4.8–10.8)

## 2023-01-07 PROCEDURE — 86146 BETA-2 GLYCOPROTEIN ANTIBODY: CPT | Mod: 91

## 2023-01-07 PROCEDURE — 80061 LIPID PANEL: CPT

## 2023-01-07 PROCEDURE — 36415 COLL VENOUS BLD VENIPUNCTURE: CPT

## 2023-01-07 PROCEDURE — 80053 COMPREHEN METABOLIC PANEL: CPT

## 2023-01-07 PROCEDURE — 85025 COMPLETE CBC W/AUTO DIFF WBC: CPT

## 2023-01-10 DIAGNOSIS — D72.10 EOSINOPHILIA, UNSPECIFIED TYPE: ICD-10-CM

## 2023-01-10 LAB
B2 GLYCOPROT1 IGA SER-ACNC: <10 SAU
B2 GLYCOPROT1 IGG SERPL IA-ACNC: <10 SGU
B2 GLYCOPROT1 IGM SERPL IA-ACNC: <10 SMU

## 2023-01-13 ENCOUNTER — OFFICE VISIT (OUTPATIENT)
Dept: VASCULAR LAB | Facility: MEDICAL CENTER | Age: 63
End: 2023-01-13
Attending: FAMILY MEDICINE
Payer: COMMERCIAL

## 2023-01-13 VITALS
BODY MASS INDEX: 23.81 KG/M2 | DIASTOLIC BLOOD PRESSURE: 75 MMHG | SYSTOLIC BLOOD PRESSURE: 114 MMHG | HEART RATE: 58 BPM | WEIGHT: 175.8 LBS | HEIGHT: 72 IN

## 2023-01-13 DIAGNOSIS — Z79.01 CHRONIC ANTICOAGULATION: ICD-10-CM

## 2023-01-13 DIAGNOSIS — I82.432 ACUTE DEEP VEIN THROMBOSIS (DVT) OF POPLITEAL VEIN OF LEFT LOWER EXTREMITY (HCC): ICD-10-CM

## 2023-01-13 DIAGNOSIS — M71.21 POPLITEAL CYST, RIGHT: ICD-10-CM

## 2023-01-13 PROCEDURE — 99212 OFFICE O/P EST SF 10 MIN: CPT

## 2023-01-13 PROCEDURE — 99215 OFFICE O/P EST HI 40 MIN: CPT | Performed by: FAMILY MEDICINE

## 2023-01-13 ASSESSMENT — ENCOUNTER SYMPTOMS
COUGH: 0
WHEEZING: 0
ORTHOPNEA: 0
BLOOD IN STOOL: 0
HEMOPTYSIS: 0
PALPITATIONS: 0
PND: 0
CHILLS: 0
FEVER: 0
BRUISES/BLEEDS EASILY: 0
CLAUDICATION: 0
SPUTUM PRODUCTION: 0
SHORTNESS OF BREATH: 0

## 2023-01-13 ASSESSMENT — FIBROSIS 4 INDEX: FIB4 SCORE: 1.24

## 2023-01-13 NOTE — PROGRESS NOTES
FOLLOW-UP VASCULAR ANTI-COAGULATION VISIT  01/13/23   Reilly Swartz is a male who presents for vasc med f/u  Referred for length of therapy (LOT) determination of anticoagulation in context of acute venothromboembolic disease    Subjective     Interval hx/concerns: LAST SEEN 10/31/21, was to f/u at 3mo but lost to f/u.  Feeling well. No new sx.      VTE disease / Anticoagulation:   Current symptoms:  Denies current SOB, CP, leg swelling, edema, leg pain, cyanosis of digits, redness of extremities.  Current antithrombotic agent: Xarelto   Complications: rectus sheath hematoma, noted after abdominal workout, one week after starting Xarelto 15mg BID for LLE popliteal DVT  Date of initiation of anticoagulation: 7/21/2021  Adherence: reports complete, no missed doses    _____Pertinent VTE pmhx:   Date of Diagnosis: 7/21/2021  Type of Venous thromboembolic disease (VTE): LLE DVT in popliteal vein extending distally into calf veins   Preceding/presenting symptoms: LLE leg pain and calf for 2 days   Antithrombotic therapy at time of VTE event: no  Any personal VTE hx? No  Any family VTE hx? No, family history of APS   _____PROVOKING FACTORS:   Other periods of immobility? Less active Details: less active lifestyle, reports slightly more sedentary since working remotely  Other known or potential risk factors for VTE disease:  yes - family history in his brother and half sister for APS; anticardiolipin antibodies negative  Hypercoaguability work-up completed?  yes - partial (see assessment for details)      Current Outpatient Medications:     rivaroxaban, 10 mg, Oral, PM MEAL     Social History     Tobacco Use    Smoking status: Never    Smokeless tobacco: Never   Vaping Use    Vaping Use: Never used   Substance Use Topics    Alcohol use: Not Currently    Drug use: Never     DIET AND EXERCISE:  Weight Change: reports gain in 20 lbs   Diet: common adult  Exercise: moderate regular exercise program    Review of Systems    Constitutional:  Negative for chills, fever and malaise/fatigue.   HENT:  Negative for nosebleeds.    Respiratory:  Negative for cough, hemoptysis, sputum production, shortness of breath and wheezing.    Cardiovascular:  Negative for chest pain, palpitations, orthopnea, claudication, leg swelling and PND.   Gastrointestinal:  Negative for blood in stool.   Endo/Heme/Allergies:  Does not bruise/bleed easily.         Objective     Vitals:    01/13/23 1552   BP: 114/75   BP Location: Left arm   Patient Position: Sitting   BP Cuff Size: Adult   Pulse: (!) 58   Weight: 79.7 kg (175 lb 12.8 oz)   Height: 1.829 m (6')       BP Readings from Last 5 Encounters:   01/13/23 114/75   01/06/23 104/80   11/19/21 100/66   11/09/21 108/70   10/27/21 120/60      Body mass index is 23.84 kg/m².    Physical Exam  Vitals reviewed.   Constitutional:       General: He is not in acute distress.     Appearance: He is well-developed. He is not diaphoretic.   HENT:      Head: Normocephalic and atraumatic.   Neck:      Thyroid: No thyromegaly.      Vascular: No JVD.   Cardiovascular:      Rate and Rhythm: Normal rate and regular rhythm.      Pulses: Normal pulses.      Heart sounds: No murmur heard.  Pulmonary:      Effort: No respiratory distress.      Breath sounds: Normal breath sounds. No wheezing or rales.   Musculoskeletal:         General: No swelling or tenderness.   Neurological:      General: No focal deficit present.      Mental Status: He is oriented to person, place, and time.      Cranial Nerves: No cranial nerve deficit.      Coordination: Coordination normal.   Psychiatric:         Mood and Affect: Mood normal.         Behavior: Behavior normal.     Lab Results   Component Value Date    CHOLSTRLTOT 156 01/07/2023    LDL 98 01/07/2023    HDL 46 01/07/2023    TRIGLYCERIDE 58 01/07/2023      Lab Results   Component Value Date    PROTHROMBTM 20.6 (H) 07/28/2021    INR 1.92 (H) 07/28/2021       Lab Results   Component Value Date     HBA1C 5.4 07/29/2021      Lab Results   Component Value Date    SODIUM 141 01/07/2023    POTASSIUM 4.4 01/07/2023    CHLORIDE 106 01/07/2023    CO2 26 01/07/2023    GLUCOSE 87 01/07/2023    BUN 23 (H) 01/07/2023    CREATININE 0.73 01/07/2023        Lab Results   Component Value Date    WBC 5.1 01/07/2023    RBC 4.63 (L) 01/07/2023    HEMOGLOBIN 14.4 01/07/2023    HEMATOCRIT 43.2 01/07/2023    MCV 93.3 01/07/2023    MCH 31.1 01/07/2023    MCHC 33.3 (L) 01/07/2023    MPV 10.1 01/07/2023       Latest Reference Range & Units Most Recent   Anti-Cardiolipin Ab Iga 0 - 11 APL <10  7/30/21 02:34   Anti-Cardiolipin Ab Igm 0 - 12 MPL <10  7/30/21 02:34   Anti-Cariolipin Ab Igg 0 - 14 GPL <10  7/30/21 02:34   Beta-2 Glycoprotein I Ab Igg <=20 SGU <10  1/7/23 08:09   Beta-2 Glycoprotein I Iga <=20 ANGIE <10  1/7/23 08:09   Beta-2 Glycoprotein I Igm <=20 SMU <10  1/7/23 08:09     VASCULAR IMAGING:     LLE venous 7/2021    1.  Occlusive DVT in the popliteal vein extending distally into the calf veins.  2.  Small collection of free fluid in the soft tissues near the area of interest could represent small seroma, hematoma, or possibly early abscess.    BLE venous 11/2021   1.  No evidence of bilateral lower extremity deep venous thrombosis.   2.  1.3 x 4.5 cm fluid collection within the right popliteal soft tissues consistent with Baker's cyst.         Medical Decision Making: Today's Assessment / Status / Plan:      1. Acute deep vein thrombosis (DVT) of popliteal vein of left lower extremity (HCC)  rivaroxaban (XARELTO) 10 MG Tab tablet    FACTOR II DNA ANALYSIS    FACTOR V LEIDEN MUTATION    CBC WITHOUT DIFFERENTIAL    Basic Metabolic Panel      2. Chronic anticoagulation  rivaroxaban (XARELTO) 10 MG Tab tablet      3. Popliteal cyst, right           Patient type:  Primary prevention     Established CVD:     1) VTE disease  7/2021 - apparently unprovoked occlusive LLE popliteal to calf vv DVT - stable, resolved on repeat duplex  11/2021   Neg for APS labs  - no additional imaging, update FVL, PGM labs     Anti-Coagulation Plan:    HAS-BLED = 1pt, hx of abd hematoma, stable h/h   Expected duration: has completed approximately 18mo on full dose DOAC as of today.  Through shared decision making, we reviewed options including continuation of full vs reduced dose DOAC vs transition to ASA.  His preference due to worry about recurrent VTE risk is to continue xarelto at reduced dose.   - reduce from xarelto 20mg to 10mg daily indefinitely, annual benefit/risk review   - Monitor closely for any s/s of bleeding and report immediately if occur.   - Recheck Q 6 months CBC, BMP also ordered partial hypercoag panel   - Avoid sedentary periods  - Avoid high impact or risk of head injury activities while on anticoagulation  - Defer any indicated work up for occult malignancy to PCP     BP mgmt:   ACC/AHA (2017) goal <130/80  Home BP at goal: yes  Office BP at goal:  yes   Plan:   - continue healthy diet, activity, weight mgmt   Monitoring:   - routine clinic-based BP measurements at least once annually   Medications: no meds indicated at this time      LIPID MANAGEMENT:   Qualifies for Statin Therapy Based on 2018 ACC/AHA Guidelines: no  The 10-year ASCVD risk score (Theodora DK, et al., 2019) is: 7.2% (borderline)  Major ASCVD events: None  High-risk conditions: None   Risk-enhancers: N/A  Currently on Statin: No  Tx goals: LDL-C <100 (consider non-HDL-C <130, apoB <90)  At goal? Yes, 1/2023  Plan:   - reinforced ongoing TLC measures as noted   - defer lab surveillance to PCP   Meds: none at this time      LIFESTYLE INTERVENTIONS:    SMOKING:     reports that he has never smoked. He has never used smokeless tobacco.   - continued complete avoidance of all tobacco products     PHYSICAL ACTIVITY: continue healthy activity to improve CV fitness.  In general, targeting >150min/week of moderate-level activity or as much as tolerated in light of functional  status and co-morbidities     WEIGHT MANAGEMENT AND NUTRITION: Mediterranean style dietary approach       Other: none     Instructed to follow-up with PCP for remainder of adult medical needs: yes  We will partner with other provider in the management of established vascular disease and cardiometabolic risk factors    Studies to Be Obtained: None  Labs to Be Obtained: as ordered above     Follow up in: 6mo     Total time: 47min - chart review/prep, review of other providers' records, imaging/lab review, face-to-face time for history/examination, ordering, prescribing,  review of results/meds/ treatment plan with patient/family/caregiver, documentation in EMR, care coordination (as needed)     Diaz Brady M.D.  [facility]   [facility]   [phone]

## 2023-01-17 ENCOUNTER — TELEPHONE (OUTPATIENT)
Dept: VASCULAR LAB | Facility: MEDICAL CENTER | Age: 63
End: 2023-01-17
Payer: COMMERCIAL

## 2023-01-17 NOTE — TELEPHONE ENCOUNTER
Contacted patient at [phone] to discuss [facility] and services/benefits offered. No answer, left voicemail.    Elissa Cardoza  Rx Coordinator   [phone]

## 2023-02-10 ENCOUNTER — HOSPITAL ENCOUNTER (OUTPATIENT)
Dept: RADIOLOGY | Facility: MEDICAL CENTER | Age: 63
End: 2023-02-10
Attending: NURSE PRACTITIONER
Payer: COMMERCIAL

## 2023-02-10 ENCOUNTER — OFFICE VISIT (OUTPATIENT)
Dept: URGENT CARE | Facility: PHYSICIAN GROUP | Age: 63
End: 2023-02-10
Payer: COMMERCIAL

## 2023-02-10 VITALS
HEART RATE: 74 BPM | DIASTOLIC BLOOD PRESSURE: 64 MMHG | TEMPERATURE: 98.1 F | RESPIRATION RATE: 18 BRPM | OXYGEN SATURATION: 98 % | SYSTOLIC BLOOD PRESSURE: 126 MMHG | HEIGHT: 72 IN | BODY MASS INDEX: 23.7 KG/M2 | WEIGHT: 175 LBS

## 2023-02-10 DIAGNOSIS — Z79.01 CHRONIC ANTICOAGULATION: ICD-10-CM

## 2023-02-10 DIAGNOSIS — R05.1 ACUTE COUGH: ICD-10-CM

## 2023-02-10 DIAGNOSIS — J06.9 UPPER RESPIRATORY INFECTION, ACUTE: ICD-10-CM

## 2023-02-10 DIAGNOSIS — I82.432 ACUTE DEEP VEIN THROMBOSIS (DVT) OF POPLITEAL VEIN OF LEFT LOWER EXTREMITY (HCC): ICD-10-CM

## 2023-02-10 LAB
FLUAV RNA SPEC QL NAA+PROBE: NEGATIVE
FLUBV RNA SPEC QL NAA+PROBE: NEGATIVE
RSV RNA SPEC QL NAA+PROBE: NEGATIVE
SARS-COV-2 RNA RESP QL NAA+PROBE: NOT DETECTED

## 2023-02-10 PROCEDURE — 0241U POCT CEPHEID COV-2, FLU A/B, RSV - PCR: CPT | Performed by: NURSE PRACTITIONER

## 2023-02-10 PROCEDURE — 99214 OFFICE O/P EST MOD 30 MIN: CPT | Performed by: NURSE PRACTITIONER

## 2023-02-10 PROCEDURE — 71046 X-RAY EXAM CHEST 2 VIEWS: CPT

## 2023-02-10 ASSESSMENT — FIBROSIS 4 INDEX: FIB4 SCORE: 1.26

## 2023-02-10 NOTE — PROGRESS NOTES
Reilly Swartz is a 63 y.o. male who presents for Cough (X 1 week, cough, shallow breaths, sob at night, runny nose)      HPI  This is a new problem. Reilly Swartz is a 63 y.o. patient who presents to urgent care with c/o: Wants to make sure he does not have pneumonia. 2 days feeling sob.  Hard to take a deep breath. Coughing for 2 days.  Cough is occasionally productive.   Started with sore throat which got worse the next day. Followed by runny nose. Symptoms keep getting worse.  At night he is feels sob especially with coughing. Has a burning sensation in his lungs.  His symptoms feel worse to him that a typical viral illness. He called his PCP who told him to come to urgent care today for further evaluation.   Has not done a covid test.   Hx of pneumonia - in college.   Treatments tried: Rest, steam - hot water from his bathroom.   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   Denies orthopnea, leg swelling, c/p, ear pain.    No other aggravating or alleviating factors.       ROS See HPI    Allergies:     No Known Allergies    PMSFS Hx:  Past Medical History:   Diagnosis Date    DVT, lower extremity (HCC)     LEFT side     Past Surgical History:   Procedure Laterality Date    OTHER ABDOMINAL SURGERY      inguinal hernia repair 2008     No family history on file.  Social History     Tobacco Use    Smoking status: Never    Smokeless tobacco: Never   Substance Use Topics    Alcohol use: Not Currently       Problems:   Patient Active Problem List   Diagnosis    Deep vein thrombosis (HCC)    Chronic anticoagulation    Elevated glucose    Rectus sheath hematoma    Hypercoagulable state (HCC)       Medications:   Current Outpatient Medications on File Prior to Visit   Medication Sig Dispense Refill    rivaroxaban (XARELTO) 10 MG Tab tablet Take 1 Tablet by mouth with dinner. 90 Tablet 3     No current facility-administered medications on file prior to visit.          Objective:     /64   Pulse 74   Temp 36.7 °C  (98.1 °F) (Temporal)   Resp 18   Ht 1.829 m (6')   Wt 79.4 kg (175 lb)   SpO2 98%   BMI 23.73 kg/m²     Physical Exam  Nursing note reviewed.   Constitutional:       General: He is not in acute distress.     Appearance: Normal appearance. He is well-developed and well-groomed. He is not ill-appearing or toxic-appearing.   HENT:      Head: Normocephalic and atraumatic.      Right Ear: Tympanic membrane, ear canal and external ear normal.      Left Ear: Tympanic membrane and external ear normal.      Nose: Nose normal.      Mouth/Throat:      Mouth: Mucous membranes are moist.   Eyes:      General:         Right eye: No discharge.         Left eye: No discharge.      Conjunctiva/sclera: Conjunctivae normal.   Cardiovascular:      Rate and Rhythm: Normal rate and regular rhythm.      Pulses: Normal pulses.      Heart sounds: Normal heart sounds.   Pulmonary:      Effort: Pulmonary effort is normal. No accessory muscle usage.      Breath sounds: Normal breath sounds and air entry. No wheezing or rhonchi.   Musculoskeletal:      Cervical back: Neck supple.   Lymphadenopathy:      Cervical: No cervical adenopathy.      Upper Body:      Right upper body: No supraclavicular adenopathy.      Left upper body: No supraclavicular adenopathy.   Skin:     General: Skin is warm and dry.      Capillary Refill: Capillary refill takes less than 2 seconds.   Neurological:      Mental Status: He is alert and oriented to person, place, and time.   Psychiatric:         Mood and Affect: Mood normal.         Behavior: Behavior normal.     Results for orders placed or performed in visit on 02/10/23   POCT CEPHEID COV-2, FLU A/B, RSV - PCR   Result Value Ref Range    SARS-CoV-2 by PCR Not Detected Not Detected, Invalid    Influenza virus A RNA Negative Negative, Invalid    Influenza virus B, PCR Negative Negative, Invalid    RSV, PCR Negative Negative, Invalid       RADIOLOGY RESULTS   DX-CHEST-2 VIEWS    Result Date:  2/10/2023  2/10/2023 2:51 PM HISTORY/REASON FOR EXAM:  Cough. TECHNIQUE/EXAM DESCRIPTION AND NUMBER OF VIEWS: Two views of the chest. COMPARISON:  None. FINDINGS: The heart is normal in size. No pulmonary infiltrates or consolidations are noted. No pleural effusions are appreciated. No pneumothorax identified.     No active disease.         Xray: Reviewed and interpreted independently by me. I agree with the radiologist's findings.       Assessment /Associated Orders:      1. Upper respiratory infection, acute        2. Acute cough  DX-CHEST-2 VIEWS    POCT CEPHEID COV-2, FLU A/B, RSV - PCR            Medical Decision Making:    Pt is clinically stable at today's acute urgent care visit.  No acute distress noted. Appropriate for outpatient care at this time.   Acute problem today .   Discussed that this illness appears to be viral in nature. I did not see any evidence of a bacterial process.   OTC medications can be used for symptom relief. Follow manufacturers guidelines for dosing and instructions.  These OTC medications will not make you better any faster but can help reduce your discomfort.   Keep well hydrated   OTC anti-tussive medication of choice to help cough. Dosage and directions per .    Humidifier at night prn    Resume all prior  RX medications. Take as prescribed.     Discussed Dx, management options (risks,benefits, and alternatives to planned treatment), natural progression and supportive care.  Expressed understanding and the treatment plan was agreed upon.   Questions were encouraged and answered   Return to urgent care prn if new or worsening sx or if there is no improvement in condition prn.    Educated in Red flags and indications to immediately call 911 or present to the Emergency Department.       Time I spent evaluating Reilly Swartz in urgent care today was 35  minutes. This time includes preparing for visit, reviewing any pertinent notes or test results,  counseling/education, exam, obtaining HPI, interpretation of lab tests, medication management and documentation as indicated above.Time does not include separately billable procedures noted .       Please note that this dictation was created using voice recognition software. I have worked with consultants from the vendor as well as technical experts from [employer] [facility] to optimize the interface. I have made every reasonable attempt to correct obvious errors, but I expect that there are errors of grammar and possibly content that I did not discover before finalizing the note.  This note was electronically signed by provider

## 2023-02-13 ENCOUNTER — TELEPHONE (OUTPATIENT)
Dept: VASCULAR LAB | Facility: MEDICAL CENTER | Age: 63
End: 2023-02-13
Payer: COMMERCIAL

## 2023-02-13 NOTE — TELEPHONE ENCOUNTER
Contacted patient at [phone] to discuss [facility] and services/benefits offered. No answer, left voicemail.    Elissa Cardoza  Rx Coordinator   [phone]

## 2023-05-20 DIAGNOSIS — Z79.01 CHRONIC ANTICOAGULATION: ICD-10-CM

## 2023-05-20 DIAGNOSIS — I82.432 ACUTE DEEP VEIN THROMBOSIS (DVT) OF POPLITEAL VEIN OF LEFT LOWER EXTREMITY (HCC): ICD-10-CM

## 2023-06-28 ENCOUNTER — OFFICE VISIT (OUTPATIENT)
Dept: MEDICAL GROUP | Facility: LAB | Age: 63
End: 2023-06-28
Payer: COMMERCIAL

## 2023-06-28 VITALS
WEIGHT: 180.6 LBS | HEIGHT: 72 IN | HEART RATE: 60 BPM | BODY MASS INDEX: 24.46 KG/M2 | DIASTOLIC BLOOD PRESSURE: 64 MMHG | TEMPERATURE: 97.4 F | SYSTOLIC BLOOD PRESSURE: 116 MMHG | OXYGEN SATURATION: 97 % | RESPIRATION RATE: 14 BRPM

## 2023-06-28 DIAGNOSIS — H61.22 IMPACTED CERUMEN OF LEFT EAR: ICD-10-CM

## 2023-06-28 PROCEDURE — 99213 OFFICE O/P EST LOW 20 MIN: CPT | Performed by: NURSE PRACTITIONER

## 2023-06-28 PROCEDURE — 3078F DIAST BP <80 MM HG: CPT | Performed by: NURSE PRACTITIONER

## 2023-06-28 PROCEDURE — 3074F SYST BP LT 130 MM HG: CPT | Performed by: NURSE PRACTITIONER

## 2023-06-28 ASSESSMENT — FIBROSIS 4 INDEX: FIB4 SCORE: 1.26

## 2023-06-28 NOTE — PROGRESS NOTES
Subjective:     CC:   Chief Complaint   Patient presents with    Ear Fullness     clogged       HPI:   Reilly presents today with the following:    Ear fullness - left side  Onset about a week ago. Reports gradual onset. History of cerumen impaction. Reports muffled hearing.  Denies otalgia, discharge, recent infection, dizziness, lightheadedness.     ROS:   As documented in history of present illness above    Objective:     Exam: /64 (BP Location: Right arm, Patient Position: Sitting, BP Cuff Size: Adult)   Pulse 60   Temp 36.3 °C (97.4 °F)   Resp 14   Ht 1.829 m (6')   Wt 81.9 kg (180 lb 9.6 oz)   SpO2 97%  Body mass index is 24.49 kg/m².    Constitutional: Alert, no distress, well-groomed.  Skin: Warm, dry, good turgor, no rashes in visible areas.  HEENT: Normocephalic. Eyes conjunctiva clear lids without ptosis, pupils equal and reactive to light accommodation, ears normal shape and contour, left canal with cerumen impaction  Neck: Trachea midline, no masses, no thyromegaly.  Respiratory: Unlabored respiratory effort, no cough.  MSK: Normal gait, moves all extremities.  Neuro: Grossly non-focal.   Psych: Alert and oriented x3, normal affect and mood.    Assessment & Plan:     63 y.o. male with the following -     1. Impacted cerumen of left ear  Discussed risks and benefits of cerumen removal. Cerumen attempted with curette and lavage after softening agent instilled. Patient tolerated well. Post procedure exam shows that there is still cerumen impaction. Discussed home softening agent daily for the next week and follow up for ear lavage.

## 2023-07-06 ENCOUNTER — TELEPHONE (OUTPATIENT)
Dept: VASCULAR LAB | Facility: MEDICAL CENTER | Age: 63
End: 2023-07-06
Payer: COMMERCIAL

## 2023-07-06 ENCOUNTER — APPOINTMENT (OUTPATIENT)
Dept: VASCULAR LAB | Facility: MEDICAL CENTER | Age: 63
End: 2023-07-06
Attending: FAMILY MEDICINE
Payer: COMMERCIAL

## 2023-07-06 NOTE — TELEPHONE ENCOUNTER
Reilly Swartz  Same day cancellation for follow-up with the vascular medicine clinic and did not reschedule.  On chronic OAC.     Scheduling staff will contact to reschedule WITH ANY AVAILABLE PROVIDER and remind patient of the importance of maintaining appointments as scheduled or to contact our office at least 24 hours in advance if they need to reschedule.       Patient should be aware of the potential for worsening conditions without appropriate follow-up and monitoring.      [facility]   [facility]   [phone]

## 2023-07-07 ENCOUNTER — OFFICE VISIT (OUTPATIENT)
Dept: MEDICAL GROUP | Facility: LAB | Age: 63
End: 2023-07-07
Payer: COMMERCIAL

## 2023-07-07 VITALS
BODY MASS INDEX: 24.03 KG/M2 | WEIGHT: 177.4 LBS | RESPIRATION RATE: 14 BRPM | DIASTOLIC BLOOD PRESSURE: 62 MMHG | HEART RATE: 62 BPM | OXYGEN SATURATION: 98 % | TEMPERATURE: 97 F | SYSTOLIC BLOOD PRESSURE: 104 MMHG | HEIGHT: 72 IN

## 2023-07-07 DIAGNOSIS — H61.23 BILATERAL IMPACTED CERUMEN: ICD-10-CM

## 2023-07-07 PROCEDURE — 99213 OFFICE O/P EST LOW 20 MIN: CPT | Performed by: FAMILY MEDICINE

## 2023-07-07 PROCEDURE — 3078F DIAST BP <80 MM HG: CPT | Performed by: FAMILY MEDICINE

## 2023-07-07 PROCEDURE — 3074F SYST BP LT 130 MM HG: CPT | Performed by: FAMILY MEDICINE

## 2023-07-07 RX ORDER — ASPIRIN 81 MG
6 TABLET, DELAYED RELEASE (ENTERIC COATED) ORAL 2 TIMES DAILY
COMMUNITY
End: 2024-01-04

## 2023-07-07 ASSESSMENT — PATIENT HEALTH QUESTIONNAIRE - PHQ9: CLINICAL INTERPRETATION OF PHQ2 SCORE: 0

## 2023-07-07 ASSESSMENT — FIBROSIS 4 INDEX: FIB4 SCORE: 1.26

## 2023-07-07 NOTE — PROGRESS NOTES
Subjective:     CC: Earwax    HPI:   Reilly presents today with concern for impacted cerumen.  Was seen by another provider a week ago with impacted cerumen.  They recommended he use home Debrox and return for irrigation.  Still feeling fullness to the left ear.    Medications, past medical history, allergies, and social history have been reviewed and updated.      Objective:       Exam:  /62 (BP Location: Right arm, Patient Position: Sitting, BP Cuff Size: Adult)   Pulse 62   Temp 36.1 °C (97 °F)   Resp 14   Ht 1.829 m (6')   Wt 80.5 kg (177 lb 6.4 oz)   SpO2 98%   BMI 24.06 kg/m²  Body mass index is 24.06 kg/m².    Constitutional: Alert. Well appearing. No distress.  Skin: Warm, dry, good turgor, no visible rashes.  ENMT: Moist mucous membranes.  Impacted cerumen to both ear canals, normal tympanic membranes bilaterally when this is removed.  Neuro: Moves all four extremities. No facial droop.  Psych: Answers questions appropriately. Normal affect and mood.    Assessment & Plan:     63 y.o. male with the following -     1. Bilateral impacted cerumen  Partially removed with irrigation today, there is some residual cerumen to the left canal that is firmly adhered with some mild surrounding bleeding.  Recommend he continue to use home Debrox along with gentle irrigation using bulb syringe at home.  Return if ear fullness does not resolve      Please note that this note was created using voice recognition software.

## 2023-12-01 ENCOUNTER — HOSPITAL ENCOUNTER (EMERGENCY)
Facility: MEDICAL CENTER | Age: 63
End: 2023-12-01
Attending: STUDENT IN AN ORGANIZED HEALTH CARE EDUCATION/TRAINING PROGRAM
Payer: COMMERCIAL

## 2023-12-01 ENCOUNTER — APPOINTMENT (OUTPATIENT)
Dept: RADIOLOGY | Facility: MEDICAL CENTER | Age: 63
End: 2023-12-01
Attending: STUDENT IN AN ORGANIZED HEALTH CARE EDUCATION/TRAINING PROGRAM
Payer: COMMERCIAL

## 2023-12-01 VITALS
DIASTOLIC BLOOD PRESSURE: 86 MMHG | OXYGEN SATURATION: 95 % | RESPIRATION RATE: 22 BRPM | TEMPERATURE: 98.1 F | SYSTOLIC BLOOD PRESSURE: 115 MMHG | WEIGHT: 186.95 LBS | HEIGHT: 72 IN | BODY MASS INDEX: 25.32 KG/M2 | HEART RATE: 56 BPM

## 2023-12-01 DIAGNOSIS — R10.84 GENERALIZED ABDOMINAL PAIN: ICD-10-CM

## 2023-12-01 LAB
ALBUMIN SERPL BCP-MCNC: 3.7 G/DL (ref 3.2–4.9)
ALBUMIN/GLOB SERPL: 1.4 G/DL
ALP SERPL-CCNC: 78 U/L (ref 30–99)
ALT SERPL-CCNC: 37 U/L (ref 2–50)
ANION GAP SERPL CALC-SCNC: 10 MMOL/L (ref 7–16)
APPEARANCE UR: CLEAR
AST SERPL-CCNC: 23 U/L (ref 12–45)
BASOPHILS # BLD AUTO: 0.8 % (ref 0–1.8)
BASOPHILS # BLD: 0.05 K/UL (ref 0–0.12)
BILIRUB SERPL-MCNC: 0.3 MG/DL (ref 0.1–1.5)
BILIRUB UR QL STRIP.AUTO: NEGATIVE
BUN SERPL-MCNC: 32 MG/DL (ref 8–22)
CALCIUM ALBUM COR SERPL-MCNC: 9 MG/DL (ref 8.5–10.5)
CALCIUM SERPL-MCNC: 8.8 MG/DL (ref 8.4–10.2)
CHLORIDE SERPL-SCNC: 107 MMOL/L (ref 96–112)
CO2 SERPL-SCNC: 24 MMOL/L (ref 20–33)
COLOR UR: YELLOW
CREAT SERPL-MCNC: 0.6 MG/DL (ref 0.5–1.4)
EOSINOPHIL # BLD AUTO: 0.49 K/UL (ref 0–0.51)
EOSINOPHIL NFR BLD: 7.9 % (ref 0–6.9)
ERYTHROCYTE [DISTWIDTH] IN BLOOD BY AUTOMATED COUNT: 43.4 FL (ref 35.9–50)
GFR SERPLBLD CREATININE-BSD FMLA CKD-EPI: 108 ML/MIN/1.73 M 2
GLOBULIN SER CALC-MCNC: 2.6 G/DL (ref 1.9–3.5)
GLUCOSE SERPL-MCNC: 75 MG/DL (ref 65–99)
GLUCOSE UR STRIP.AUTO-MCNC: NEGATIVE MG/DL
HCT VFR BLD AUTO: 43.3 % (ref 42–52)
HGB BLD-MCNC: 14.2 G/DL (ref 14–18)
IMM GRANULOCYTES # BLD AUTO: 0.01 K/UL (ref 0–0.11)
IMM GRANULOCYTES NFR BLD AUTO: 0.2 % (ref 0–0.9)
KETONES UR STRIP.AUTO-MCNC: NEGATIVE MG/DL
LEUKOCYTE ESTERASE UR QL STRIP.AUTO: NEGATIVE
LIPASE SERPL-CCNC: 28 U/L (ref 11–82)
LYMPHOCYTES # BLD AUTO: 1.67 K/UL (ref 1–4.8)
LYMPHOCYTES NFR BLD: 26.8 % (ref 22–41)
MCH RBC QN AUTO: 30 PG (ref 27–33)
MCHC RBC AUTO-ENTMCNC: 32.8 G/DL (ref 32.3–36.5)
MCV RBC AUTO: 91.5 FL (ref 81.4–97.8)
MICRO URNS: NORMAL
MONOCYTES # BLD AUTO: 0.49 K/UL (ref 0–0.85)
MONOCYTES NFR BLD AUTO: 7.9 % (ref 0–13.4)
NEUTROPHILS # BLD AUTO: 3.52 K/UL (ref 1.82–7.42)
NEUTROPHILS NFR BLD: 56.4 % (ref 44–72)
NITRITE UR QL STRIP.AUTO: NEGATIVE
NRBC # BLD AUTO: 0 K/UL
NRBC BLD-RTO: 0 /100 WBC (ref 0–0.2)
PH UR STRIP.AUTO: 5.5 [PH] (ref 5–8)
PLATELET # BLD AUTO: 252 K/UL (ref 164–446)
PMV BLD AUTO: 9.3 FL (ref 9–12.9)
POTASSIUM SERPL-SCNC: 4.1 MMOL/L (ref 3.6–5.5)
PROT SERPL-MCNC: 6.3 G/DL (ref 6–8.2)
PROT UR QL STRIP: NEGATIVE MG/DL
RBC # BLD AUTO: 4.73 M/UL (ref 4.7–6.1)
RBC UR QL AUTO: NEGATIVE
SODIUM SERPL-SCNC: 141 MMOL/L (ref 135–145)
SP GR UR STRIP.AUTO: 1.02
WBC # BLD AUTO: 6.2 K/UL (ref 4.8–10.8)

## 2023-12-01 PROCEDURE — 85025 COMPLETE CBC W/AUTO DIFF WBC: CPT

## 2023-12-01 PROCEDURE — 81003 URINALYSIS AUTO W/O SCOPE: CPT

## 2023-12-01 PROCEDURE — 36415 COLL VENOUS BLD VENIPUNCTURE: CPT

## 2023-12-01 PROCEDURE — 700117 HCHG RX CONTRAST REV CODE 255: Performed by: STUDENT IN AN ORGANIZED HEALTH CARE EDUCATION/TRAINING PROGRAM

## 2023-12-01 PROCEDURE — 80053 COMPREHEN METABOLIC PANEL: CPT

## 2023-12-01 PROCEDURE — 99284 EMERGENCY DEPT VISIT MOD MDM: CPT

## 2023-12-01 PROCEDURE — 74177 CT ABD & PELVIS W/CONTRAST: CPT

## 2023-12-01 PROCEDURE — 83690 ASSAY OF LIPASE: CPT

## 2023-12-01 RX ADMIN — IOHEXOL 100 ML: 350 INJECTION, SOLUTION INTRAVENOUS at 20:53

## 2023-12-01 ASSESSMENT — FIBROSIS 4 INDEX: FIB4 SCORE: 1.26

## 2023-12-02 NOTE — ED NOTES
Pt. Verbalizes understanding of discharge instructions. accompanied to lobby with friend. Pt. Alert/awake in NAD.  All questions answered and understood. Advised to ff-up with PCP.

## 2023-12-02 NOTE — ED TRIAGE NOTES
Chief Complaint   Patient presents with    Abdominal Pain     64 yo male ambulates to triage with reports of lower mid abdominal pain for the past 2 weeks.  Denies any radiation reports pain is right at the navel region.  Denies N/V/D denies blood in stool.  Denies fever     /81   Pulse 63   Temp 36.6 °C (97.8 °F) (Temporal)   Resp 16   Ht 1.829 m (6')   Wt 84.8 kg (186 lb 15.2 oz)   SpO2 97%   BMI 25.35 kg/m²

## 2023-12-02 NOTE — ED PROVIDER NOTES
CHIEF COMPLAINT  Chief Complaint   Patient presents with    Abdominal Pain     64 yo male ambulates to triage with reports of lower mid abdominal pain for the past 2 weeks.  Denies any radiation reports pain is right at the navel region.  Denies N/V/D denies blood in stool.  Denies fever        LIMITATION TO HISTORY   Select:     HPI    Reilly Swartz is a 63 y.o. male who presents to the Emergency Department for evaluation of periumbilical abdominal pain 5 out of 10 gradual worsening over the last 2 weeks patient has a history of rectus sheath hematoma takes Xarelto for a unprovoked DVT    OUTSIDE HISTORIAN(S):  Select:    EXTERNAL RECORDS REVIEWED  Select: Reviewed discharge summary July 31, 2021         Discharge Summary     CHIEF COMPLAINT ON ADMISSION       Chief Complaint   Patient presents with    Abdominal Pain       started about 8 hours now   sharp pain           Reason for Admission  Abdominal Pain       Admission Date  7/28/2021     CODE STATUS  Full Code     HPI & HOSPITAL COURSE  This is a 61 y.o. male with a History of occlusive left popliteal DVT diagnosed 1 week ago, started on Xarelto admitted for sudden onset abdominal discomfort after doing crunches found to have a rectus sheath hematoma.  General surgery was consulted and Xarelto was held.  Surgery recommended conservative management.  His hemoglobin was trended and showed a very slight downtrend however quickly began to rise.  He remained stable off of Xarelto for 24 hours therefore his Xarelto was resumed.  He was monitored for an additional 24 hours and he continued to have significant improvement of his abdominal discomfort.  Hemoglobin remained largely stable therefore he was determined stable to discharge to resume Coumadin therapy.  He gave further history that he has a brother and half sister who have antiphospholipid syndrome therefore the possibility of lifelong anticoagulation was discussed with him.  Antiphospholipid antibody was  checked however is pending at the time of this dictation.  The patient has an appointment to follow-up with Dr. Michael Bloch to discuss further anticoagulation treatment/duration.  This lab can be followed up and/or repeated if needed at that appointment.        Therefore, he is discharged in good and stable condition to home with close outpatient follow-up.     The patient met 2-midnight criteria for an inpatient stay at the time of discharge.     Discharge Date  7/31/2021           PAST MEDICAL HISTORY  Past Medical History:   Diagnosis Date    DVT, lower extremity (HCC)     LEFT side     .    SURGICAL HISTORY  Past Surgical History:   Procedure Laterality Date    OTHER ABDOMINAL SURGERY      inguinal hernia repair 2008         FAMILY HISTORY  History reviewed. No pertinent family history.       SOCIAL HISTORY  Social History     Socioeconomic History    Marital status:      Spouse name: Not on file    Number of children: Not on file    Years of education: Not on file    Highest education level: Not on file   Occupational History    Not on file   Tobacco Use    Smoking status: Never    Smokeless tobacco: Never   Vaping Use    Vaping Use: Never used   Substance and Sexual Activity    Alcohol use: Not Currently    Drug use: Never    Sexual activity: Not on file   Other Topics Concern    Not on file   Social History Narrative    Not on file     Social Determinants of Health     Financial Resource Strain: Not on file   Food Insecurity: Not on file   Transportation Needs: Not on file   Physical Activity: Not on file   Stress: Not on file   Social Connections: Not on file   Intimate Partner Violence: Not on file   Housing Stability: Not on file         CURRENT MEDICATIONS  No current facility-administered medications on file prior to encounter.     Current Outpatient Medications on File Prior to Encounter   Medication Sig Dispense Refill    carbamide peroxide (DEBROX) 6.5 % Solution Administer 6 Drops into  affected ear(s) 2 times a day.      rivaroxaban (XARELTO) 10 MG Tab tablet Take 1 Tablet by mouth with dinner. 90 Tablet 3           ALLERGIES  No Known Allergies    PHYSICAL EXAM  VITAL SIGNS:/86   Pulse (!) 56   Temp 36.7 °C (98.1 °F) (Temporal)   Resp (!) 22   Ht 1.829 m (6')   Wt 84.8 kg (186 lb 15.2 oz)   SpO2 95%   BMI 25.35 kg/m²       GENERAL: Awake and alert  HEAD: Normocephalic and atraumatic  NECK: Normal range of motion, without meningismus  EYES: Pupils Equal, Round, Reactive to Light, extraocular movements intact, conjunctiva white  ENT: Mucous membranes moist, oropharynx clear  PULMONARY: Normal effort, clear to auscultation  CARDIOVASCULAR: No murmurs, clicks or rubs, peripheral pulses 2+  ABDOMINAL: Soft, non-tender, no guarding or rigidity present, no pulsatile masses  BACK: no midline tenderness, no costovertebral tenderness  NEUROLOGICAL: Grossly non-focal neurological examination, speech normal, gait normal  EXTREMITIES: No edema, normal to inspection  SKIN: Warm and dry.  PSYCHIATRIC: Affect is appropriate    DIAGNOSTIC STUDIES / PROCEDURES  EKG    LABS  Labs Reviewed   CBC WITH DIFFERENTIAL - Abnormal; Notable for the following components:       Result Value    Eosinophils 7.90 (*)     All other components within normal limits   COMP METABOLIC PANEL - Abnormal; Notable for the following components:    Bun 32 (*)     All other components within normal limits   LIPASE   URINALYSIS   ESTIMATED GFR         RADIOLOGY  I independently reviewed and interpreted the images obtained today in the ER.  No free fluid in the abdomen    Radiologist interpretation:   CT-ABDOMEN-PELVIS WITH   Final Result      1.  No evidence of acute inflammatory process in the abdomen or pelvis   2.  Enlarged prostate.           COURSE & MEDICAL DECISION MAKING    ED COURSE:    ED Observation Status? Yes; I am placing the patient in to an observation status due to a diagnostic uncertainty as well as therapeutic  intensity. Patient placed in observation status at 7:20 PM December 1, 2023    Observation plan is as follows: Patient presenting with considerable diagnostic uncertainty regarding the etiology of the patients symptoms. For this reason patient will be placed in observation, we will provide analgesia and perform serial abdominal examanitation and vital sign monitoring. Blood work will be reviewed, imaging will be ordered dependent on patients response to analgesia and labratory analysis.        INTERVENTIONS BY ME:  Medications   iohexol (OMNIPAQUE) 350 mg/mL (IV) (100 mL Intravenous Given 12/1/23 2053)       Response on recheck:  9:30 PM patient resting comfortably in no distress  10:30 PM abdomen soft nontender patient with normal vital signs at this time not requesting any analgesia  11:17 PM discussed with the patient his workup so far which is reassuring do not see any clear etiology the patient's symptoms however his workup is reassuring patient is reassured by this and feels safe to be discharged home    Patient discharged from ED observation at  11:17 PM 12/1/23        INITIAL ASSESSMENT, COURSE AND PLAN  Care Narrative:   Patient with history of rectus sheath hematoma on Xarelto CT scan without evidence of hematoma.    Upon my interpretation of this patients symptomatology, examination, lab and imaging studies performed today, this patient's presentation is not consistent with perforated viscus, gonadal torsion, ruptured abdominal aortic aneurysm, acute mesenteric ischemia, acute coronary syndrome, or a pulmonary embolism.    Satisfactory analgesia achieved in the emergency department, patient ambulatory,  tolerating oral fluids, and agreeable with discharge treatment plan of care.         ADDITIONAL PROBLEM LIST    DISPOSITION AND DISCUSSIONS      FINAL DIAGNOSIS  1. Generalized abdominal pain             Electronically signed by: Piotr Gabriel DO ,12:29 AM 12/02/23

## 2023-12-24 ENCOUNTER — HOSPITAL ENCOUNTER (EMERGENCY)
Facility: MEDICAL CENTER | Age: 63
End: 2023-12-24
Attending: EMERGENCY MEDICINE
Payer: COMMERCIAL

## 2023-12-24 VITALS
TEMPERATURE: 97.6 F | WEIGHT: 182.98 LBS | BODY MASS INDEX: 24.78 KG/M2 | SYSTOLIC BLOOD PRESSURE: 108 MMHG | DIASTOLIC BLOOD PRESSURE: 71 MMHG | HEIGHT: 72 IN | OXYGEN SATURATION: 98 % | RESPIRATION RATE: 18 BRPM | HEART RATE: 70 BPM

## 2023-12-24 DIAGNOSIS — N30.01 ACUTE HEMORRHAGIC CYSTITIS: ICD-10-CM

## 2023-12-24 LAB
APPEARANCE UR: ABNORMAL
BACTERIA #/AREA URNS HPF: ABNORMAL /HPF
BILIRUB UR QL STRIP.AUTO: NEGATIVE
COLOR UR: ABNORMAL
EPI CELLS #/AREA URNS HPF: ABNORMAL /HPF
GLUCOSE UR STRIP.AUTO-MCNC: NEGATIVE MG/DL
KETONES UR STRIP.AUTO-MCNC: NEGATIVE MG/DL
LEUKOCYTE ESTERASE UR QL STRIP.AUTO: NEGATIVE
MICRO URNS: ABNORMAL
NITRITE UR QL STRIP.AUTO: NEGATIVE
PH UR STRIP.AUTO: 6 [PH] (ref 5–8)
PROT UR QL STRIP: 100 MG/DL
RBC # URNS HPF: >150 /HPF
RBC UR QL AUTO: ABNORMAL
SP GR UR STRIP.AUTO: >=1.03
WBC #/AREA URNS HPF: ABNORMAL /HPF

## 2023-12-24 PROCEDURE — 87086 URINE CULTURE/COLONY COUNT: CPT

## 2023-12-24 PROCEDURE — 700102 HCHG RX REV CODE 250 W/ 637 OVERRIDE(OP): Performed by: EMERGENCY MEDICINE

## 2023-12-24 PROCEDURE — 99283 EMERGENCY DEPT VISIT LOW MDM: CPT

## 2023-12-24 PROCEDURE — 81001 URINALYSIS AUTO W/SCOPE: CPT

## 2023-12-24 PROCEDURE — A9270 NON-COVERED ITEM OR SERVICE: HCPCS | Performed by: EMERGENCY MEDICINE

## 2023-12-24 RX ORDER — SULFAMETHOXAZOLE AND TRIMETHOPRIM 800; 160 MG/1; MG/1
1 TABLET ORAL ONCE
Status: COMPLETED | OUTPATIENT
Start: 2023-12-24 | End: 2023-12-24

## 2023-12-24 RX ORDER — SULFAMETHOXAZOLE AND TRIMETHOPRIM 800; 160 MG/1; MG/1
1 TABLET ORAL 2 TIMES DAILY
Qty: 10 TABLET | Refills: 0 | Status: ACTIVE | OUTPATIENT
Start: 2023-12-24 | End: 2023-12-29

## 2023-12-24 RX ADMIN — SULFAMETHOXAZOLE AND TRIMETHOPRIM 1 TABLET: 800; 160 TABLET ORAL at 19:07

## 2023-12-24 ASSESSMENT — FIBROSIS 4 INDEX: FIB4 SCORE: 0.95

## 2023-12-25 NOTE — ED TRIAGE NOTES
Chief Complaint   Patient presents with    Blood in Urine     X3 times today states that it is red and it burns to go     Pt is on blood thinner        Temp 36.2 °C (97.1 °F) (Temporal)   Ht 1.829 m (6')   Wt 83 kg (182 lb 15.7 oz)   BMI 24.82 kg/m²

## 2023-12-25 NOTE — ED PROVIDER NOTES
ED Provider Note    Primary care provider: Nir Hidalgo M.D.    CHIEF COMPLAINT  Chief Complaint   Patient presents with    Blood in Urine     X3 times today states that it is red and it burns to go     Pt is on blood thinner            HPI  Reilly Swartz is a 63 y.o. male who presents to the Emergency Department for dark urine.  The patient has been on Xarelto for unprovoked DVT.  He has had some complications thereof, notably an abdominal sheath hematoma.  He does get frustrated with the easy bruising and bleeding.  He had 3 episodes today that were dark, not lissa blood.  He is just more irritated by the possibility of spontaneous hematuria.  He denies any pain at this time.      External Record Review: Patient last seen in the emergency department for abdominal pain 12/2, labs, CT unremarkable.  History of unprovoked DVT on Xarelto.    REVIEW OF SYSTEMS  See HPI.     PAST MEDICAL HISTORY   has a past medical history of DVT, lower extremity (HCC).    SURGICAL HISTORY   has a past surgical history that includes other abdominal surgery.    SOCIAL HISTORY  Social History     Tobacco Use    Smoking status: Never    Smokeless tobacco: Never   Vaping Use    Vaping Use: Never used   Substance Use Topics    Alcohol use: Not Currently    Drug use: Never      Social History     Substance and Sexual Activity   Drug Use Never       FAMILY HISTORY  No family history on file.    CURRENT MEDICATIONS  Reviewed.  See Encounter Summary.     ALLERGIES  No Known Allergies    PHYSICAL EXAM  VITAL SIGNS: /71   Pulse 70   Temp 36.4 °C (97.6 °F) (Temporal)   Resp 18   Ht 1.829 m (6')   Wt 83 kg (182 lb 15.7 oz)   SpO2 98%   BMI 24.82 kg/m²   Constitutional: Awake, alert in no apparent distress.  HENT: Normocephalic, Bilateral external ears normal. Nose normal.   Eyes: Conjunctiva normal, non-icteric, EOMI.    Thorax & Lungs: Easy unlabored respirations, Clear to ascultation bilaterally.  Cardiovascular:  Regular rate, Regular rhythm, No murmurs, rubs or gallops. Bilateral pulses symmetrical.   Abdomen:  Soft, nontender, nondistended, normal active bowel sounds.   :    Skin: Visualized skin is  Dry, No erythema, No rash.   Musculoskeletal:   No cyanosis, clubbing or edema. No leg asymmetry.   Neurologic: Alert, Grossly non-focal.   Psychiatric: Normal affect, Normal mood  Lymphatic:        COURSE & MEDICAL DECISION MAKING  Pertinent Labs & Imaging studies reviewed. (See chart for details)    COURSE & MEDICAL DECISION MAKING  Pertinent Labs & Imaging studies reviewed. (See chart for details)    Differential diagnoses include but are not limited to: Cystitis, spontaneous bladder hemorrhage    6:24 PM - Nursing notes reviewed, patient seen and examined at bedside.        Escalation of care considered, and ultimately not performed: blood analysis, diagnostic imaging, and acute inpatient care management, however at this time, the patient is most appropriate for outpatient management.    Decision Making:  This is a pleasant 63 y.o. year old male who presents with hematuria.  The patient is having some irritation with urination but he is not having any pain.    Urinalysis has significant hematuria, few bacteria, few epithelial cells, 0-2 WBCs.  Although this is not a compelling evidence of an infection, acute cystitis in the setting of Xarelto could cause hemorrhage.  I considered the possibility of malignancy and ureterolithiasis, seems very unlikely given that he had a normal contrasted CT of the abdomen pelvis just a few weeks ago.    I do not feel that a repeat CT would have any diagnostic utility given the normal collecting system.  He also is not having any flank pain that would suggest renal colic today.    The patient ran 3 miles on a treadmill today, certainly vigorous exercise has been known to cause hematuria which will be exacerbated in the setting of Xarelto.  He does run regularly though and this is the  first time he had this despite being on Xarelto for 2 years.    In any case, I do not have a definitive explanation for the cause of the hemorrhage, though he is well-appearing, recent imaging and renal function are normal.  He is relatively asymptomatic, not passing any clots.  I think given that it has been 2 years since he had his unprovoked DVT is reasonable to hold the Xarelto for now, short course of antibiotics for possible infection, urine culture has been ordered.       The patient was discharged home (see d/c instructions) was told to return immediately for any signs or symptoms listed, or any worsening at all.  The patient verbally agreed to the discharge precautions and follow-up plan which is documented in EPIC.    Discharge Medications:  New Prescriptions    SULFAMETHOXAZOLE-TRIMETHOPRIM (BACTRIM DS) 800-160 MG TABLET    Take 1 Tablet by mouth 2 times a day for 5 days.       FINAL IMPRESSION  1. Acute hemorrhagic cystitis

## 2023-12-28 ENCOUNTER — HOSPITAL ENCOUNTER (OUTPATIENT)
Dept: LAB | Facility: MEDICAL CENTER | Age: 63
End: 2023-12-28
Attending: FAMILY MEDICINE
Payer: COMMERCIAL

## 2023-12-28 ENCOUNTER — OFFICE VISIT (OUTPATIENT)
Dept: MEDICAL GROUP | Facility: LAB | Age: 63
End: 2023-12-28
Payer: COMMERCIAL

## 2023-12-28 VITALS
HEIGHT: 72 IN | SYSTOLIC BLOOD PRESSURE: 104 MMHG | OXYGEN SATURATION: 98 % | TEMPERATURE: 97.7 F | RESPIRATION RATE: 12 BRPM | BODY MASS INDEX: 25.33 KG/M2 | WEIGHT: 187 LBS | HEART RATE: 64 BPM | DIASTOLIC BLOOD PRESSURE: 62 MMHG

## 2023-12-28 DIAGNOSIS — D72.10 EOSINOPHILIA, UNSPECIFIED TYPE: ICD-10-CM

## 2023-12-28 DIAGNOSIS — I82.432 ACUTE DEEP VEIN THROMBOSIS (DVT) OF POPLITEAL VEIN OF LEFT LOWER EXTREMITY (HCC): ICD-10-CM

## 2023-12-28 DIAGNOSIS — Z12.5 SCREENING FOR PROSTATE CANCER: ICD-10-CM

## 2023-12-28 DIAGNOSIS — Z86.718 HISTORY OF DVT (DEEP VEIN THROMBOSIS): ICD-10-CM

## 2023-12-28 DIAGNOSIS — R31.0 GROSS HEMATURIA: ICD-10-CM

## 2023-12-28 LAB
ANION GAP SERPL CALC-SCNC: 8 MMOL/L (ref 7–16)
BACTERIA UR CULT: NORMAL
BASOPHILS # BLD AUTO: 1.1 % (ref 0–1.8)
BASOPHILS # BLD: 0.05 K/UL (ref 0–0.12)
BUN SERPL-MCNC: 36 MG/DL (ref 8–22)
CALCIUM SERPL-MCNC: 8.9 MG/DL (ref 8.5–10.5)
CHLORIDE SERPL-SCNC: 106 MMOL/L (ref 96–112)
CO2 SERPL-SCNC: 26 MMOL/L (ref 20–33)
CREAT SERPL-MCNC: 1.19 MG/DL (ref 0.5–1.4)
EOSINOPHIL # BLD AUTO: 0.37 K/UL (ref 0–0.51)
EOSINOPHIL NFR BLD: 8.5 % (ref 0–6.9)
ERYTHROCYTE [DISTWIDTH] IN BLOOD BY AUTOMATED COUNT: 45.2 FL (ref 35.9–50)
GFR SERPLBLD CREATININE-BSD FMLA CKD-EPI: 68 ML/MIN/1.73 M 2
GLUCOSE SERPL-MCNC: 106 MG/DL (ref 65–99)
HCT VFR BLD AUTO: 42.6 % (ref 42–52)
HGB BLD-MCNC: 14.2 G/DL (ref 14–18)
IMM GRANULOCYTES # BLD AUTO: 0.01 K/UL (ref 0–0.11)
IMM GRANULOCYTES NFR BLD AUTO: 0.2 % (ref 0–0.9)
LYMPHOCYTES # BLD AUTO: 0.97 K/UL (ref 1–4.8)
LYMPHOCYTES NFR BLD: 22.3 % (ref 22–41)
MCH RBC QN AUTO: 31.3 PG (ref 27–33)
MCHC RBC AUTO-ENTMCNC: 33.3 G/DL (ref 32.3–36.5)
MCV RBC AUTO: 93.8 FL (ref 81.4–97.8)
MONOCYTES # BLD AUTO: 0.34 K/UL (ref 0–0.85)
MONOCYTES NFR BLD AUTO: 7.8 % (ref 0–13.4)
NEUTROPHILS # BLD AUTO: 2.61 K/UL (ref 1.82–7.42)
NEUTROPHILS NFR BLD: 60.1 % (ref 44–72)
NRBC # BLD AUTO: 0 K/UL
NRBC BLD-RTO: 0 /100 WBC (ref 0–0.2)
PLATELET # BLD AUTO: 252 K/UL (ref 164–446)
PMV BLD AUTO: 10.3 FL (ref 9–12.9)
POTASSIUM SERPL-SCNC: 5.1 MMOL/L (ref 3.6–5.5)
PSA SERPL-MCNC: 2.19 NG/ML (ref 0–4)
RBC # BLD AUTO: 4.54 M/UL (ref 4.7–6.1)
SIGNIFICANT IND 70042: NORMAL
SITE SITE: NORMAL
SODIUM SERPL-SCNC: 140 MMOL/L (ref 135–145)
SOURCE SOURCE: NORMAL
WBC # BLD AUTO: 4.4 K/UL (ref 4.8–10.8)

## 2023-12-28 PROCEDURE — 99214 OFFICE O/P EST MOD 30 MIN: CPT | Performed by: FAMILY MEDICINE

## 2023-12-28 PROCEDURE — 85025 COMPLETE CBC W/AUTO DIFF WBC: CPT

## 2023-12-28 PROCEDURE — 81240 F2 GENE: CPT

## 2023-12-28 PROCEDURE — 36415 COLL VENOUS BLD VENIPUNCTURE: CPT

## 2023-12-28 PROCEDURE — 3074F SYST BP LT 130 MM HG: CPT | Performed by: FAMILY MEDICINE

## 2023-12-28 PROCEDURE — 81241 F5 GENE: CPT

## 2023-12-28 PROCEDURE — 80048 BASIC METABOLIC PNL TOTAL CA: CPT

## 2023-12-28 PROCEDURE — 3078F DIAST BP <80 MM HG: CPT | Performed by: FAMILY MEDICINE

## 2023-12-28 PROCEDURE — 84153 ASSAY OF PSA TOTAL: CPT

## 2023-12-28 ASSESSMENT — FIBROSIS 4 INDEX: FIB4 SCORE: 0.95

## 2023-12-28 NOTE — PROGRESS NOTES
Subjective:     CC: ER follow up    HPI:   Reilly presents today to follow-up on recent ER visit for gross hematuria.  Has been on low-dose Xarelto for history of unprovoked DVT.  History of gross hematuria 4 days ago and he went to the ER where UA confirmed hematuria, culture is negative.  He has since been holding Xarelto.  This happened after working out and has happened again despite holding Xarelto after he again worked out.  Not having pain with urination.  He is wondering if it would be reasonable to discontinue the Xarelto, had previously had discussion with vascular medicine and had decided to stick with low-dose.  Never ended up doing additional hypercoagulability labs.    Medications, past medical history, allergies, and social history have been reviewed and updated.      Objective:       Exam:  /62   Pulse 64   Temp 36.5 °C (97.7 °F) (Temporal)   Resp 12   Ht 1.829 m (6')   Wt 84.8 kg (187 lb)   SpO2 98%   BMI 25.36 kg/m²  Body mass index is 25.36 kg/m².    Constitutional: Alert. Well appearing. No distress.  Skin: Warm, dry, good turgor, no visible rashes.  Respiratory: Normal effort.   Neuro: Moves all four extremities. No facial droop.  Psych: Answers questions appropriately. Normal affect and mood.      Assessment & Plan:     63 y.o. male with the following -     1. Gross hematuria  Multiple recent episodes after exercise, most recent episode did occur even after he has been holding low-dose Xarelto.  UA in ER confirmed hematuria and culture has been negative.  Referral to urology to discuss further evaluation.  - Referral to Urology    2. History of DVT (deep vein thrombosis)  Unprovoked in 2021.  He previously had visit with vascular medicine and discussed full dose DOAC versus reduced dose DOAC versus ASA and decided to do reduced dose DOAC.  However, he has had multiple bleeding issues including recent hematuria as above and would like to consider possible transition to aspirin.   Recommended he complete the additional hypercoagulability labs previously ordered by vascular medicine and then can plan further discussion with me or with them regarding anticoagulation plan.    3. Screening for prostate cancer  - PROSTATE SPECIFIC AG SCREENING; Future      Please note that this note was created using voice recognition software.

## 2024-01-02 LAB
F2 C.20210G>A GENO BLD/T: NEGATIVE
F5 P.R506Q BLD/T QL: NEGATIVE

## 2024-01-03 ENCOUNTER — HOSPITAL ENCOUNTER (OUTPATIENT)
Dept: LAB | Facility: MEDICAL CENTER | Age: 64
End: 2024-01-03
Attending: FAMILY MEDICINE
Payer: COMMERCIAL

## 2024-01-03 ENCOUNTER — OFFICE VISIT (OUTPATIENT)
Dept: MEDICAL GROUP | Facility: LAB | Age: 64
End: 2024-01-03
Payer: COMMERCIAL

## 2024-01-03 VITALS
OXYGEN SATURATION: 98 % | RESPIRATION RATE: 12 BRPM | HEART RATE: 48 BPM | HEIGHT: 72 IN | BODY MASS INDEX: 25.33 KG/M2 | DIASTOLIC BLOOD PRESSURE: 62 MMHG | SYSTOLIC BLOOD PRESSURE: 108 MMHG | WEIGHT: 187 LBS | TEMPERATURE: 97.1 F

## 2024-01-03 DIAGNOSIS — Z86.718 HISTORY OF DVT (DEEP VEIN THROMBOSIS): ICD-10-CM

## 2024-01-03 DIAGNOSIS — R94.4 DECREASED GFR: ICD-10-CM

## 2024-01-03 DIAGNOSIS — R31.0 GROSS HEMATURIA: ICD-10-CM

## 2024-01-03 LAB
AMORPH CRY #/AREA URNS HPF: PRESENT /HPF
ANION GAP SERPL CALC-SCNC: 6 MMOL/L (ref 7–16)
APPEARANCE UR: ABNORMAL
BACTERIA #/AREA URNS HPF: ABNORMAL /HPF
BILIRUB UR QL STRIP.AUTO: NEGATIVE
BUN SERPL-MCNC: 22 MG/DL (ref 8–22)
CALCIUM SERPL-MCNC: 9 MG/DL (ref 8.5–10.5)
CAOX CRY #/AREA URNS HPF: ABNORMAL /HPF
CHLORIDE SERPL-SCNC: 108 MMOL/L (ref 96–112)
CO2 SERPL-SCNC: 27 MMOL/L (ref 20–33)
COLOR UR: YELLOW
CREAT SERPL-MCNC: 0.69 MG/DL (ref 0.5–1.4)
EPI CELLS #/AREA URNS HPF: ABNORMAL /HPF
GFR SERPLBLD CREATININE-BSD FMLA CKD-EPI: 103 ML/MIN/1.73 M 2
GLUCOSE SERPL-MCNC: 88 MG/DL (ref 65–99)
GLUCOSE UR STRIP.AUTO-MCNC: NEGATIVE MG/DL
HYALINE CASTS #/AREA URNS LPF: ABNORMAL /LPF
KETONES UR STRIP.AUTO-MCNC: NEGATIVE MG/DL
LEUKOCYTE ESTERASE UR QL STRIP.AUTO: NEGATIVE
MICRO URNS: ABNORMAL
NITRITE UR QL STRIP.AUTO: NEGATIVE
PH UR STRIP.AUTO: 6 [PH] (ref 5–8)
POTASSIUM SERPL-SCNC: 4.1 MMOL/L (ref 3.6–5.5)
PROT UR QL STRIP: NEGATIVE MG/DL
RBC # URNS HPF: ABNORMAL /HPF
RBC UR QL AUTO: NEGATIVE
SODIUM SERPL-SCNC: 141 MMOL/L (ref 135–145)
SP GR UR STRIP.AUTO: >=1.03
UROBILINOGEN UR STRIP.AUTO-MCNC: 0.2 MG/DL
WBC #/AREA URNS HPF: ABNORMAL /HPF

## 2024-01-03 PROCEDURE — 80048 BASIC METABOLIC PNL TOTAL CA: CPT

## 2024-01-03 PROCEDURE — 82570 ASSAY OF URINE CREATININE: CPT

## 2024-01-03 PROCEDURE — 81001 URINALYSIS AUTO W/SCOPE: CPT

## 2024-01-03 PROCEDURE — 84156 ASSAY OF PROTEIN URINE: CPT

## 2024-01-03 PROCEDURE — 99214 OFFICE O/P EST MOD 30 MIN: CPT | Performed by: FAMILY MEDICINE

## 2024-01-03 PROCEDURE — 36415 COLL VENOUS BLD VENIPUNCTURE: CPT

## 2024-01-03 PROCEDURE — 3074F SYST BP LT 130 MM HG: CPT | Performed by: FAMILY MEDICINE

## 2024-01-03 PROCEDURE — 3078F DIAST BP <80 MM HG: CPT | Performed by: FAMILY MEDICINE

## 2024-01-03 ASSESSMENT — PATIENT HEALTH QUESTIONNAIRE - PHQ9: CLINICAL INTERPRETATION OF PHQ2 SCORE: 0

## 2024-01-03 ASSESSMENT — FIBROSIS 4 INDEX: FIB4 SCORE: 0.95

## 2024-01-03 NOTE — LETTER
[facility]  Nir Hidalgo M.D.  [address]  [place] [address]  Fax: [phone]   Authorization for Release/Disclosure of   Protected Health Information   Name: REILLY WALTON : 1960 SSN: [id number]   Address: [address]  [place] [address] Phone:    [phone] (home)    I authorize the entity listed below to release/disclose the PHI below to:   [facility]/Nir Hidalgo M.D. and Nir Hidalgo M.D.   Provider or Entity Name:     Address   City, State, Zip   Phone:      Fax:     Reason for request: continuity of care   Information to be released:    [  ] LAST COLONOSCOPY,  including any PATH REPORT and follow-up  [  ] LAST FIT/COLOGUARD RESULT [  ] LAST DEXA  [  ] LAST MAMMOGRAM  [  ] LAST PAP  [  ] LAST LABS [  ] RETINA EXAM REPORT  [  ] IMMUNIZATION RECORDS  [  ] Release all info      [  ] Check here and initial the line next to each item to release ALL health information INCLUDING  _____ Care and treatment for drug and / or alcohol abuse  _____ HIV testing, infection status, or AIDS  _____ Genetic Testing    DATES OF SERVICE OR TIME PERIOD TO BE DISCLOSED: _____________  I understand and acknowledge that:  * This Authorization may be revoked at any time by you in writing, except if your health information has already been used or disclosed.  * Your health information that will be used or disclosed as a result of you signing this authorization could be re-disclosed by the recipient. If this occurs, your re-disclosed health information may no longer be protected by State or Federal laws.  * You may refuse to sign this Authorization. Your refusal will not affect your ability to obtain treatment.  * This Authorization becomes effective upon signing and will  on (date) __________.      If no date is indicated, this Authorization will  one (1) year from the signature date.    Name: Reilly Walton  Signature: Date:    1/3/2024     PLEASE FAX REQUESTED RECORDS BACK TO: [phone]

## 2024-01-03 NOTE — PROGRESS NOTES
Subjective:     CC: Lab follow up    HPI:   Reilly is a 63-year-old male with a history of DVT previously on Xarelto who presents to follow-up on recent gross hematuria and lab results.  Was initially evaluated in the ER on 12/24 for gross hematuria and has since been holding Xarelto.  UA in the ER confirmed hematuria, culture was negative.  Episode have happened after vigorous exercise and even with holding Xarelto he did have an additional episode after exercise.  Repeat labs after the ER were notable for a decrease in GFR from a baseline of >100 to 68.  Did have a CT scan 1 month ago which was without acute findings.  No pain with urination.  No current abdominal pain or flank pain.  Denies blood clots in the urine.  He does a appointment with urology scheduled next month.    Medications, past medical history, allergies, and social history have been reviewed and updated.      Objective:       Exam:  /62   Pulse (!) 48   Temp 36.2 °C (97.1 °F) (Temporal)   Resp 12   Ht 1.829 m (6')   Wt 84.8 kg (187 lb)   SpO2 98%   BMI 25.36 kg/m²  Body mass index is 25.36 kg/m².    Constitutional: Alert. Well appearing. No distress.  Skin: Warm, dry, good turgor, no visible rashes.  ENMT: Moist mucous membranes. Normal dentition.  Respiratory: Normal effort.   Abdomen: Soft, nontender, no CVA tenderness.  Neuro: Moves all four extremities. No facial droop.  Ext: No edema noted.  Psych: Answers questions appropriately. Normal affect and mood.      Assessment & Plan:     63 y.o. male with the following -     1. Gross hematuria  2. Decreased GFR  Multiple recent episodes of gross hematuria, most notably after exercise while he was on Xarelto.  Urinalysis confirmed hematuria along with proteinuria and negative culture.  Has evaluation planned with urology next month.  Recent labs were notable for a decrease in GFR, will repeat labs along with renal ultrasound and spot protein creatinine ratio evaluating for  renal/glomerular issue.  - Basic Metabolic Panel; Future  - US-RENAL; Future  - PROTEIN/CREAT RATIO URINE; Future  - URINALYSIS,CULTURE IF INDICATED; Future    2. History of DVT (deep vein thrombosis)  Unprovoked in 2021. He previously had visit with vascular medicine and discussed full dose DOAC versus reduced dose DOAC versus ASA and decided to do reduced dose DOAC.  Recent gross hematuria as above and has since stopped Xarelto.  Recent hypercoagulability labs normal/negative.  Has appointment tomorrow with vascular medicine to discuss anticoagulation.      Please note that this note was created using voice recognition software.

## 2024-01-04 ENCOUNTER — OFFICE VISIT (OUTPATIENT)
Dept: VASCULAR LAB | Facility: MEDICAL CENTER | Age: 64
End: 2024-01-04
Attending: FAMILY MEDICINE
Payer: COMMERCIAL

## 2024-01-04 ENCOUNTER — APPOINTMENT (OUTPATIENT)
Dept: RADIOLOGY | Facility: MEDICAL CENTER | Age: 64
End: 2024-01-04
Attending: FAMILY MEDICINE
Payer: COMMERCIAL

## 2024-01-04 VITALS
WEIGHT: 184 LBS | DIASTOLIC BLOOD PRESSURE: 71 MMHG | HEIGHT: 72 IN | BODY MASS INDEX: 24.92 KG/M2 | HEART RATE: 56 BPM | SYSTOLIC BLOOD PRESSURE: 108 MMHG

## 2024-01-04 DIAGNOSIS — N40.0 BENIGN PROSTATIC HYPERPLASIA, UNSPECIFIED WHETHER LOWER URINARY TRACT SYMPTOMS PRESENT: ICD-10-CM

## 2024-01-04 DIAGNOSIS — I82.432 ACUTE DEEP VEIN THROMBOSIS (DVT) OF POPLITEAL VEIN OF LEFT LOWER EXTREMITY (HCC): ICD-10-CM

## 2024-01-04 DIAGNOSIS — R31.0 GROSS HEMATURIA: ICD-10-CM

## 2024-01-04 DIAGNOSIS — R94.4 DECREASED GFR: ICD-10-CM

## 2024-01-04 DIAGNOSIS — Z79.02 LONG TERM (CURRENT) USE OF ANTITHROMBOTICS/ANTIPLATELETS: ICD-10-CM

## 2024-01-04 LAB
CREAT UR-MCNC: 265.65 MG/DL
PROT UR-MCNC: 16 MG/DL (ref 0–15)
PROT/CREAT UR: 60 MG/G (ref 15–68)

## 2024-01-04 PROCEDURE — 3074F SYST BP LT 130 MM HG: CPT | Performed by: FAMILY MEDICINE

## 2024-01-04 PROCEDURE — 99214 OFFICE O/P EST MOD 30 MIN: CPT | Performed by: FAMILY MEDICINE

## 2024-01-04 PROCEDURE — 99212 OFFICE O/P EST SF 10 MIN: CPT

## 2024-01-04 PROCEDURE — 76775 US EXAM ABDO BACK WALL LIM: CPT

## 2024-01-04 PROCEDURE — 3078F DIAST BP <80 MM HG: CPT | Performed by: FAMILY MEDICINE

## 2024-01-04 RX ORDER — ASPIRIN 81 MG/1
81 TABLET ORAL DAILY
Qty: 30 TABLET | COMMUNITY
Start: 2024-01-04 | End: 2024-03-11

## 2024-01-04 ASSESSMENT — ENCOUNTER SYMPTOMS
WHEEZING: 0
CHILLS: 0
CLAUDICATION: 0
BLOOD IN STOOL: 0
ORTHOPNEA: 0
PND: 0
COUGH: 0
SHORTNESS OF BREATH: 0
BRUISES/BLEEDS EASILY: 0
PALPITATIONS: 0
SPUTUM PRODUCTION: 0
HEMOPTYSIS: 0
FEVER: 0

## 2024-01-04 ASSESSMENT — FIBROSIS 4 INDEX: FIB4 SCORE: 0.95

## 2024-01-04 NOTE — PROGRESS NOTES
FOLLOW-UP VASCULAR ANTI-COAGULATION VISIT  01/04/24    Reilly Swartz is a male who presents for vasc med f/u  Referred for length of therapy (LOT) determination of anticoagulation in context of acute venothromboembolic disease    Subjective     Interval hx/concerns: LAST SEEN 1/2023, missed last appointment. Reports no longer taking xarelto due to 2 visits to the ED related to gross hematuria - had negative CT scan and UA with >150 RBCs and seen by PCP and pending with urology as per ref to PCP.   Denies current SOB, CP, leg swelling, edema, leg pain, cyanosis of digits, redness of extremities.  Reports a high level of dislike for use of anticoagulants     VTE disease / Anticoagulation:  Date of Diagnosis: 7/21/2021  Type of Venous thromboembolic disease (VTE): LLE DVT in popliteal vein extending distally into calf veins   Current antithrombotic agent: none, prior on xarelto   Complications: rectus sheath hematoma, noted after abdominal workout, one week after starting Xarelto 15mg BID for LLE popliteal DVT  Date of initiation of anticoagulation: 7/21/2021  Adherence: reports complete, no missed doses    Pertinent VTE pmhx:   Preceding/presenting symptoms: LLE leg pain and calf for 2 days   Antithrombotic therapy at time of VTE event: no  Any personal VTE hx? No  Any family VTE hx? No, family history of APS   PROVOKING FACTORS:   -less active lifestyle, reports slightly more sedentary since working remotely  Other known or potential risk factors for VTE disease:  yes - family history in his brother and half sister for APS; anticardiolipin antibodies negative  Hypercoaguability work-up completed?  yes - partial (see assessment for details)      Current Outpatient Medications:     aspirin, 81 mg, Oral, DAILY     Social History     Tobacco Use    Smoking status: Never    Smokeless tobacco: Never   Vaping Use    Vaping Use: Never used   Substance Use Topics    Alcohol use: Not Currently    Drug use: Never     DIET  AND EXERCISE:  Weight Change: reports gain in 20 lbs   Wt Readings from Last 3 Encounters:   01/04/24 83.5 kg (184 lb)   01/03/24 84.8 kg (187 lb)   12/28/23 84.8 kg (187 lb)    Diet: common adult  Exercise: moderate regular exercise program    Review of Systems   Constitutional:  Negative for chills, fever and malaise/fatigue.   HENT:  Negative for nosebleeds.    Respiratory:  Negative for cough, hemoptysis, sputum production, shortness of breath and wheezing.    Cardiovascular:  Negative for chest pain, palpitations, orthopnea, claudication, leg swelling and PND.   Gastrointestinal:  Negative for blood in stool.   Endo/Heme/Allergies:  Does not bruise/bleed easily.       Objective     Vitals:    01/04/24 0901   BP: 108/71   BP Location: Left arm   Patient Position: Sitting   BP Cuff Size: Adult   Pulse: (!) 56   Weight: 83.5 kg (184 lb)   Height: 1.829 m (6')       BP Readings from Last 5 Encounters:   01/04/24 108/71   01/03/24 108/62   12/28/23 104/62   12/24/23 108/71   12/01/23 115/86      Body mass index is 24.95 kg/m².    Physical Exam  Vitals reviewed.   Constitutional:       General: He is not in acute distress.     Appearance: He is well-developed. He is not diaphoretic.   HENT:      Head: Normocephalic and atraumatic.   Neck:      Thyroid: No thyromegaly.      Vascular: No JVD.   Cardiovascular:      Rate and Rhythm: Normal rate and regular rhythm.      Pulses: Normal pulses.      Heart sounds: No murmur heard.  Pulmonary:      Effort: No respiratory distress.      Breath sounds: Normal breath sounds. No wheezing or rales.   Musculoskeletal:         General: No swelling or tenderness.   Neurological:      General: No focal deficit present.      Mental Status: He is oriented to person, place, and time.      Cranial Nerves: No cranial nerve deficit.      Coordination: Coordination normal.   Psychiatric:         Mood and Affect: Mood normal.         Behavior: Behavior normal.       Lab Results   Component  Value Date    CHOLSTRLTOT 156 01/07/2023    LDL 98 01/07/2023    HDL 46 01/07/2023    TRIGLYCERIDE 58 01/07/2023                   Lab Results   Component Value Date    SODIUM 141 01/03/2024    POTASSIUM 4.1 01/03/2024    CHLORIDE 108 01/03/2024    CO2 27 01/03/2024    GLUCOSE 88 01/03/2024    BUN 22 01/03/2024    CREATININE 0.69 01/03/2024        Lab Results   Component Value Date    WBC 4.4 (L) 12/28/2023    RBC 4.54 (L) 12/28/2023    HEMOGLOBIN 14.2 12/28/2023    HEMATOCRIT 42.6 12/28/2023    MCV 93.8 12/28/2023    MCH 31.3 12/28/2023    MCHC 33.3 12/28/2023    MPV 10.3 12/28/2023 12/28/23 09:05   Factor Ii Dna Analysis Pt Gene Negative   V Leiden Factor Negative      Latest Reference Range & Units Most Recent   Anti-Cardiolipin Ab Iga 0 - 11 APL <10  7/30/21 02:34   Anti-Cardiolipin Ab Igm 0 - 12 MPL <10  7/30/21 02:34   Anti-Cariolipin Ab Igg 0 - 14 GPL <10  7/30/21 02:34   Beta-2 Glycoprotein I Ab Igg <=20 SGU <10  1/7/23 08:09   Beta-2 Glycoprotein I Iga <=20 ANGIE <10  1/7/23 08:09   Beta-2 Glycoprotein I Igm <=20 SMU <10  1/7/23 08:09      Latest Reference Range & Units 12/24/23 18:10 12/28/23 09:06 01/03/24 15:50   Color  Red !  Yellow   Character  Turbid !  Cloudy !   Specific Gravity <1.035  >=1.030  >=1.030   Ph 5.0 - 8.0  6.0  6.0   Glucose Negative mg/dL Negative  Negative   Ketones Negative mg/dL Negative  Negative   Bilirubin Negative  Negative  Negative   Occult Blood Negative  Large !  Negative   Protein Negative mg/dL 100 !  Negative   Nitrite Negative  Negative  Negative   Leukocyte Esterase Negative  Negative  Negative   Urobilinogen, Urine Negative    0.2   Micro Urine Req  Microscopic  Microscopic   WBC /hpf 0-2 !  0-2 !   RBC /hpf >150 !  0-2 !   Epithelial Cells /hpf Rare  Few   Bacteria None /hpf Few !  Rare !   Amorphous Crystal /hpf   Present   Ca Oxalate Crystal /hpf   Few   Hyaline Cast /lpf   0-2   Prostatic Specific Antigen Tot 0.00 - 4.00 ng/mL  2.19      VASCULAR IMAGING    LLE  venous 7/2021    1.  Occlusive DVT in the popliteal vein extending distally into the calf veins.  2.  Small collection of free fluid in the soft tissues near the area of interest could represent small seroma, hematoma, or possibly early abscess.    BLE venous 11/2021   1.  No evidence of bilateral lower extremity deep venous thrombosis.   2.  1.3 x 4.5 cm fluid collection within the right popliteal soft tissues consistent with Baker's cyst.    CT a/p 12/1/23  1.  No evidence of acute inflammatory process in the abdomen or pelvis  2.  Enlarged prostate       Medical Decision Making: Today's Assessment / Status / Plan:      1. Acute deep vein thrombosis (DVT) of popliteal vein of left lower extremity (HCC)  aspirin 81 MG EC tablet      2. Benign prostatic hyperplasia, unspecified whether lower urinary tract symptoms present        3. Gross hematuria        4. Long term (current) use of antithrombotics/antiplatelets           Patient type:  Primary prevention     Established CVD:     1) VTE disease  7/2021 - apparently unprovoked occlusive LLE popliteal to calf vv DVT - stable, resolved on repeat duplex 11/2021   Thrombophilia w/u:  Neg for APS, FVL, PGM  - Per meta-analysis/systemic analysis (BMJ 2019), provided data on risks for recurrent VTE in patients with unprovoked VTE whose anticoagulation was stopped after at least 3 months:   - Cumulative incidence rates of recurrent VTE:  10% (1yr), 16% (2yrs), 25% (5 yrs), 36% (10ys)  - Cumulative incidence of fatal PE::  0.4% (1yr), 0.7% (2yrs), 1.0% (5yrs), 1.5% (10yrs)  - 10-year cumulative incidence lower in women than men (29% vs 41%)   - Recurrent VTE risk over 2 years in pts with proximal DVT (with or w/o PE) (17% vs 12%)  - Recurent VTE risk over 1 year in pts with isolated distal DVT = only 2%   Plan:   - no additional imaging or labs   - see OAC plan below   - Avoid sedentary periods  - counseled on s/s of new VTE events that require immediate attention      Anti-Coagulation Plan:  HAS-BLED = 1pt, hx of abd hematoma, stable h/h   Expected duration: has completed approximately 18mo on full dose DOAC as of today.  Through shared decision making, we had reviewed indefinite use of low dose DOAC, however due to episodes of gross hematuria and current preference to avoid ongoing of OAC, we have opted to d/c DOAC and start ASA ongoing   - pt aware of higher risk for recurrent VTE and should there be another event, would necessitate indefinite OAC   Plan:  - stop xarelto   - suggested to continued with ASA 81mg daily for now - unless new gross hematuria   - Monitor closely for any s/s of bleeding and report immediately if occur.     BP mgmt:   ACC/AHA (2017) goal <130/80  Home BP at goal: yes  Office BP at goal:  yes   Plan:   - continue healthy diet, activity, weight mgmt   Monitoring:   - routine clinic-based BP measurements at least once annually   Medications: no meds indicated at this time      LIPID MANAGEMENT:   Qualifies for Statin Therapy Based on 2018 ACC/AHA Guidelines: no  The 10-year ASCVD risk score (Theodora YORK, et al., 2019) is: 7.1% (borderline)  Major ASCVD events: None  High-risk conditions: None   Risk-enhancers: N/A  Currently on Statin: No  Tx goals: LDL-C <100 (consider non-HDL-C <130, apoB <90)  At goal? Yes, 1/2023  Plan:   - reinforced ongoing TLC measures as noted   - defer lab surveillance to PCP   Meds: none at this time      LIFESTYLE INTERVENTIONS:    SMOKING:    reports that he has never smoked. He has never used smokeless tobacco.   - continued complete avoidance of all tobacco products     PHYSICAL ACTIVITY: continue healthy activity to improve CV fitness.  In general, targeting >150min/week of moderate-level activity or as much as tolerated in light of functional status and co-morbidities     WEIGHT MANAGEMENT AND NUTRITION: Mediterranean style dietary approach       Other:     # gross hematuria - normal CT, had resolved UA  Enlarged  prostate on CA  Plan:   - pending renal u/s as per PCP tomorrow   - pending eval with urology     Instructed to follow-up with PCP for remainder of adult medical needs: yes  We will partner with other provider in the management of established vascular disease and cardiometabolic risk factors    Studies to Be Obtained: US renal - pending tomorrow as per PCP   Labs to Be Obtained: as ordered above     Follow up in: prn     Time: 33min - chart review/prep, review of other providers' records, imaging/lab review, face-to-face time for history/examination, ordering, prescribing,  review of results/meds/ treatment plan with patient/family/caregiver, documentation in EMR, care coordination (as needed)     Diaz Brady M.D.  [facility]   [facility]   [phone]

## 2024-01-09 ENCOUNTER — APPOINTMENT (OUTPATIENT)
Dept: MEDICAL GROUP | Facility: LAB | Age: 64
End: 2024-01-09
Payer: COMMERCIAL

## 2024-02-01 ENCOUNTER — OFFICE VISIT (OUTPATIENT)
Dept: UROLOGY | Facility: MEDICAL CENTER | Age: 64
End: 2024-02-01
Payer: COMMERCIAL

## 2024-02-01 VITALS
DIASTOLIC BLOOD PRESSURE: 56 MMHG | HEART RATE: 57 BPM | SYSTOLIC BLOOD PRESSURE: 108 MMHG | TEMPERATURE: 98.4 F | HEIGHT: 72 IN | BODY MASS INDEX: 25.06 KG/M2 | WEIGHT: 185 LBS | OXYGEN SATURATION: 96 %

## 2024-02-01 DIAGNOSIS — R31.9 HEMATURIA, UNSPECIFIED TYPE: Primary | ICD-10-CM

## 2024-02-01 DIAGNOSIS — N40.1 BENIGN PROSTATIC HYPERPLASIA WITH NOCTURIA: ICD-10-CM

## 2024-02-01 DIAGNOSIS — R35.1 BENIGN PROSTATIC HYPERPLASIA WITH NOCTURIA: ICD-10-CM

## 2024-02-01 LAB
APPEARANCE UR: CLEAR
BILIRUB UR STRIP-MCNC: NORMAL MG/DL
COLOR UR AUTO: YELLOW
GLUCOSE UR STRIP.AUTO-MCNC: NORMAL MG/DL
KETONES UR STRIP.AUTO-MCNC: NORMAL MG/DL
LEUKOCYTE ESTERASE UR QL STRIP.AUTO: NORMAL
NITRITE UR QL STRIP.AUTO: NORMAL
PH UR STRIP.AUTO: 5.5 [PH] (ref 5–8)
POC POST-VOID: NORMAL
POC PRE-VOID: 30 ML
PROT UR QL STRIP: NORMAL MG/DL
RBC UR QL AUTO: NORMAL
SP GR UR STRIP.AUTO: 1.03
UROBILINOGEN UR STRIP-MCNC: 0.2 MG/DL

## 2024-02-01 PROCEDURE — 3074F SYST BP LT 130 MM HG: CPT

## 2024-02-01 PROCEDURE — 99214 OFFICE O/P EST MOD 30 MIN: CPT

## 2024-02-01 PROCEDURE — 3078F DIAST BP <80 MM HG: CPT

## 2024-02-01 PROCEDURE — 51798 US URINE CAPACITY MEASURE: CPT

## 2024-02-01 PROCEDURE — 81002 URINALYSIS NONAUTO W/O SCOPE: CPT

## 2024-02-01 RX ORDER — TAMSULOSIN HYDROCHLORIDE 0.4 MG/1
0.4 CAPSULE ORAL
Qty: 30 CAPSULE | Refills: 1 | Status: SHIPPED | OUTPATIENT
Start: 2024-02-01

## 2024-02-01 ASSESSMENT — FIBROSIS 4 INDEX: FIB4 SCORE: 0.95

## 2024-02-01 NOTE — PROGRESS NOTES
Subjective  Reilly Swartz is a 63 y.o. male who presents today for evaluation of gross hematuria starting in December which was worse on [place] renae. Went to ED, stopped Xarelto for 1 week.     Patient currently on Xarelto for DVT in 2021. Patient has been back on Xarelto since beginning of January. Patient has not had any gross hematuria since.    Patient reports intermittently started his stream, occasional weak stream, no observed blood in urine since he started xarelto in January, pt denies sensation of incomplete emptying- but reports at times could urinate more at times. Frequency- patient reports voiding 6 times per day depending on hydration. Patient reports urgency with leakage, No recent UTI infections- intermittent dysuria, patient reports discomfort. Nocturia- Patient reports he is urination 2-3 times per night.     No complaints of ED at this time-able to maintain erections and ejaculate    PSA on 12/2023 was 2.19.     No family history of bladder cancer or prostate cancer. No history of kidney stones    No family history on file.    Social History     Socioeconomic History    Marital status:      Spouse name: Not on file    Number of children: Not on file    Years of education: Not on file    Highest education level: Not on file   Occupational History    Not on file   Tobacco Use    Smoking status: Never    Smokeless tobacco: Never   Vaping Use    Vaping Use: Never used   Substance and Sexual Activity    Alcohol use: Not Currently    Drug use: Never    Sexual activity: Not on file   Other Topics Concern    Not on file   Social History Narrative    Not on file     Social Determinants of Health     Financial Resource Strain: Not on file   Food Insecurity: Not on file   Transportation Needs: Not on file   Physical Activity: Not on file   Stress: Not on file   Social Connections: Not on file   Intimate Partner Violence: Not on file   Housing Stability: Not on file       Past Surgical  History:   Procedure Laterality Date    OTHER ABDOMINAL SURGERY      inguinal hernia repair 2008       Past Medical History:   Diagnosis Date    DVT, lower extremity (HCC)     LEFT side       Current Outpatient Medications   Medication Sig Dispense Refill    Rivaroxaban (XARELTO PO) Take  by mouth.      aspirin 81 MG EC tablet Take 1 Tablet by mouth every day. (Patient not taking: Reported on 2/1/2024) 30 Tablet      No current facility-administered medications for this visit.       Not on File    Objective  /56 (BP Location: Left arm, Patient Position: Sitting, BP Cuff Size: Adult)   Pulse (!) 57   Temp 36.9 °C (98.4 °F) (Temporal)   Ht 1.829 m (6')   Wt 83.9 kg (185 lb)   SpO2 96%   Physical Exam  Constitutional:       Appearance: Normal appearance.   Eyes:      Extraocular Movements: Extraocular movements intact.   Pulmonary:      Effort: Pulmonary effort is normal.   Genitourinary:     Comments: Pre 38  Skin:     General: Skin is warm and dry.   Neurological:      Mental Status: He is alert.         Labs:   POCT UA   Lab Results   Component Value Date/Time    POCCOLOR yellow 02/01/2024 03:12 PM    POCAPPEAR clear 02/01/2024 03:12 PM    POCLEUKEST neg 02/01/2024 03:12 PM    POCNITRITE neg 02/01/2024 03:12 PM    POCUROBILIGE 0.2 02/01/2024 03:12 PM    POCPROTEIN neg 02/01/2024 03:12 PM    POCURPH 5.5 02/01/2024 03:12 PM    POCBLOOD neg 02/01/2024 03:12 PM    POCSPGRV 1.030 02/01/2024 03:12 PM    POCKETONES neg 02/01/2024 03:12 PM    POCBILIRUBIN neg 02/01/2024 03:12 PM    POCGLUCUA neg 02/01/2024 03:12 PM          Imaging:   CT PELVIS WITH   CT-ABDOMEN-PELVIS WITH 12/01/2023    Narrative  12/1/2023 8:34 PM    HISTORY/REASON FOR EXAM:  periumbilical pain, distant h/o rectus sheath hematoma.      TECHNIQUE/EXAM DESCRIPTION:   CT scan of the abdomen and pelvis with contrast.    Contrast-enhanced helical scanning was obtained from the diaphragmatic domes through the pubic symphysis following the bolus  administration of nonionic contrast without complication.    100 mL of Omnipaque 350 nonionic contrast was administered without complication.    Low dose optimization technique was utilized for this CT exam including automated exposure control and adjustment of the mA and/or kV according to patient size.    This study was submitted for review at approximately 10:40 PM.    COMPARISON: 7/28/2021    FINDINGS:  Lower Chest: Unremarkable    Liver: Normal    Gallbladder: No calcified stones    Biliary: Nondilated    Pancreas: Unremarkable    Spleen: Unremarkable    Adrenal glands: Normal    Kidneys: LEFT renal cortical scar similar to prior in the lateral midpole. Otherwise unremarkable.    Bowel: Normal Appendix not visualized.    Lymph nodes: No adenopathy    Vasculature: Unremarkable    Peritoneum: Unremarkable without ascites    Musculoskeletal: No acute or destructive process    Pelvis: No adenopathy or free fluid. Prostate is enlarged.    Small umbilical hernia containing fat.    Impression  1.  No evidence of acute inflammatory process in the abdomen or pelvis  2.  Enlarged prostate.      Assessment    At this time, I have scheduled a cystoscopy for gross hematuria with Dr. Poon.  I suspect that gross hematuria is likely related to trauma or irritation with prostate patient has an enlarged prostate as his PSA has been trending in the 2 range. It is my recommendation that patient does follow through and have a cystoscopy just to ensure that bleeding is indeed coming from his prostate.  Patient has been on Xarelto since January.  I suspect that bleeding is related to the Xarelto.  Patient has not observed any blood in his urine since December.    Urinalysis in clinic today was negative for blood.    For BPH with LUTS, I have prescribed patient Flomax I have educated patient on side effects to include retrograde ejaculation and syncope-dizziness.  I have also discussed possible surgical interventions for BPH with  LUTS as patient has nocturia, and difficulty starting his stream with weak urinary stream at times.  Patient at this time is unsure if he will want to start Flomax therefore I have sent medication should patient decide to start this medication.  I have also ordered a repeat PSA to be completed in 1 year.    I have referred patient to Dr. Rodriguez for a cystoscopy for further evaluation and management of BPH or possible lesions in bladder    Plan    Problem List Items Addressed This Visit    None  Visit Diagnoses       Hematuria, unspecified type    -  Primary    Relevant Orders    POCT Bladder Scan    POCT Urinalysis

## 2024-02-01 NOTE — PATIENT INSTRUCTIONS
Cystoscopy - What to Expect     Preparation: Before the procedure, the patient will typically be asked to empty their bladder. No fasting is usually required, but it's a good idea to wear comfortable clothing. The patient may also be given the option of a local anesthetic gel to numb the urethra.     Patient Positioning: The patient will be asked to lie down on an examination table. If you are female you will be asked to position yourself with your feet in stirrups, similar to a gynecological examination. It's essential to ensure the patient is as comfortable as possible. Male patients will stay laying down with their legs on the table.      Sterile Field: You will be undressed from the waist down. The assistant will use betadine or hibiclens to clean the penis or vaginal opening. The healthcare provider will use sterile techniques to maintain a clean environment during the procedure. They will wear gloves and use sterile instruments.     Catheter Insertion: A lubricated cystoscope, which is a thin, flexible tube with a light and camera, is gently inserted into the patient's urethra. Some patients may feel a mild discomfort or pressure during this step.     Visualization: As the cystoscope is advanced through the urethra into the bladder, the healthcare provider will use it to inspect the bladder lining and look for any abnormalities. The patient may be able to see the images on a screen if the procedure is being conducted using a video system.     Biopsy or Treatment: If any abnormalities or concerns are identified during the cystoscopy, the healthcare provider may take a biopsy for further examination or perform any necessary treatments. This could include the removal of bladder stones, tissue samples, or other interventions.     Cystoscopy Completion: Once the examination and any necessary procedures are completed, the cystoscope is gently removed from the bladder, and the procedure is finished.     Recovery:  Most patients can resume their normal activities immediately after the cystoscopy. Some may experience mild discomfort or a burning sensation during urination for a short time afterward, but this usually subsides quickly.     Risks and Benefits:     Benefits:     Diagnostic Tool: Flexible cystoscopy is a valuable diagnostic tool that helps your healthcare provider assess and diagnose various urinary tract conditions, including bladder tumors, urinary tract infections, and urinary incontinence.   Minimally Invasive: The procedure is minimally invasive, meaning it involves a small incision or no incision at all, reducing the risk of complications.   Treatment: In some cases, flexible cystoscopy can be used to treat certain urinary tract conditions, such as removing small bladder stones or benign growths.     Risks:     Discomfort: While the procedure is generally well-tolerated, you may experience some discomfort, mild burning during urination, or a feeling of urgency to urinate immediately after the procedure.   Infection: There is a slight risk of urinary tract infection following the procedure, but this is rare.   Bleeding: Minimal bleeding is possible, particularly if a biopsy is taken during the cystoscopy.   Rare Complications: Serious complications are exceedingly rare but may include injury to the urethra or bladder.      PLEASE stop Xarelto 48 hours before cystoscopy. We will clear this with Dr Hidalgo.    LOOK UP TURP procedure

## 2024-02-01 NOTE — ASSESSMENT & PLAN NOTE
Cystoscopy with Dr. Poon to be scheduled.  I will contact primary care provider to ensure it is okay for patient to stop taking Xarelto for 2 days prior to cystoscopy in 2 days after   pulse oximetry

## 2024-03-11 ENCOUNTER — TELEPHONE (OUTPATIENT)
Dept: VASCULAR LAB | Facility: MEDICAL CENTER | Age: 64
End: 2024-03-11
Payer: COMMERCIAL

## 2024-03-11 DIAGNOSIS — D68.59 HYPERCOAGULABLE STATE (HCC): ICD-10-CM

## 2024-03-11 NOTE — TELEPHONE ENCOUNTER
Attempted to contact patient at [phone] to discuss [facility] and services/benefits offered. No answer, left voicemail.    Michelle Larios

## 2024-03-11 NOTE — TELEPHONE ENCOUNTER
Called and left message. Letting the patient know our office refilled Xarelto to [facility] KAREL Khanna.     Isela Hood, Med Ass't  [facility]  Ph. [phone]  Fx. [phone]

## 2024-03-19 ENCOUNTER — PROCEDURE VISIT (OUTPATIENT)
Dept: UROLOGY | Facility: MEDICAL CENTER | Age: 64
End: 2024-03-19
Payer: COMMERCIAL

## 2024-03-19 VITALS — BODY MASS INDEX: 25.06 KG/M2 | HEIGHT: 72 IN | WEIGHT: 185 LBS

## 2024-03-19 DIAGNOSIS — R31.0 GROSS HEMATURIA: ICD-10-CM

## 2024-03-19 PROCEDURE — 52000 CYSTOURETHROSCOPY: CPT | Performed by: STUDENT IN AN ORGANIZED HEALTH CARE EDUCATION/TRAINING PROGRAM

## 2024-03-19 ASSESSMENT — FIBROSIS 4 INDEX: FIB4 SCORE: 0.96

## 2024-03-20 NOTE — PROGRESS NOTES
Subjective  Reilly Swartz is a 64 y.o. male who presents today for cystoscopy to evaluate Gross Hematuria. Imaging is benign, ready to proceed. No symptoms of UTI.    No family history on file.    Social History     Socioeconomic History    Marital status:      Spouse name: Not on file    Number of children: Not on file    Years of education: Not on file    Highest education level: Not on file   Occupational History    Not on file   Tobacco Use    Smoking status: Never    Smokeless tobacco: Never   Vaping Use    Vaping Use: Never used   Substance and Sexual Activity    Alcohol use: Not Currently    Drug use: Never    Sexual activity: Not on file   Other Topics Concern    Not on file   Social History Narrative    Not on file     Social Determinants of Health     Financial Resource Strain: Not on file   Food Insecurity: Not on file   Transportation Needs: Not on file   Physical Activity: Not on file   Stress: Not on file   Social Connections: Not on file   Intimate Partner Violence: Not on file   Housing Stability: Not on file       Past Surgical History:   Procedure Laterality Date    OTHER ABDOMINAL SURGERY      inguinal hernia repair 2008       Past Medical History:   Diagnosis Date    DVT, lower extremity (HCC)     LEFT side       Current Outpatient Medications   Medication Sig Dispense Refill    rivaroxaban (XARELTO) 10 MG Tab tablet Take 1 Tablet by mouth with dinner. 100 Tablet 3    tamsulosin (FLOMAX) 0.4 MG capsule Take 1 Capsule by mouth 1/2 hour after breakfast. 30 Capsule 1     No current facility-administered medications for this visit.       No Known Allergies    Objective  Ht 1.829 m (6')   Wt 83.9 kg (185 lb)   Physical Exam  Constitutional:       Appearance: Normal appearance.   HENT:      Head: Normocephalic and atraumatic.   Eyes:      Extraocular Movements: Extraocular movements intact.      Conjunctiva/sclera: Conjunctivae normal.   Pulmonary:      Effort: No respiratory distress.    Musculoskeletal:      Cervical back: Normal range of motion.   Skin:     General: Skin is warm and dry.   Neurological:      General: No focal deficit present.      Mental Status: He is alert.   Psychiatric:         Mood and Affect: Mood normal.         Labs:     POC UA  Lab Results   Component Value Date/Time    POCCOLOR yellow 02/01/2024 03:12 PM    POCAPPEAR clear 02/01/2024 03:12 PM    POCLEUKEST neg 02/01/2024 03:12 PM    POCNITRITE neg 02/01/2024 03:12 PM    POCUROBILIGE 0.2 02/01/2024 03:12 PM    POCPROTEIN neg 02/01/2024 03:12 PM    POCURPH 5.5 02/01/2024 03:12 PM    POCBLOOD neg 02/01/2024 03:12 PM    POCSPGRV 1.030 02/01/2024 03:12 PM    POCKETONES neg 02/01/2024 03:12 PM    POCBILIRUBIN neg 02/01/2024 03:12 PM    POCGLUCUA neg 02/01/2024 03:12 PM          Imaging:     Renal US 1/4/24: enlarged prostate, some debris within bladder, PVR 13 cc    Procedure    Procedure performed: Cystoscopy     Surgeon: Dr. Zahra Rodriguez    Indications For Procedure: Gross Hematuria    Blood Loss: 0 cc     Anesthesia: Lidocaine jelly     Specimen: none     Findings:     1. Urethra: no lesions or masses    2. Bladder: no lesions or masses    3. Bilateral ureteral jets observed with clear efflux      4. Prostate: Severe lateral lobe hypertrophy, Large median lobe, 4.5 cm prostate length     Description of Procedure: The patient was prepped and draped in the usual sterile fashion.  A 17Fr flexible cystoscope was advanced along the urethra into the bladder under direct vision.  We performed a thorough cystoscopic evaluation patient's bladder including retroflexion, findings noted above.  We removed the cystoscope under direct vision to evaluate the urethra and prostate, findings noted above.  This concluded the procedure, the patient tolerated it well.     Assessment  Patient was instructed to call our office if he develops any signs of infection including increased frequency, urgency, dysuria, fever, or chills.  We discussed  the results of the cystoscopy with the patient, all questions and concerns addressed.     Plan    1. Gross hematuria    Imaging and cystoscopy negative for significant findings other than an enlarged prostate with some superficial vessels visible. No concern for malignancy. Patient has minimal bother in regards to urination, does not want any other therapy at this time.

## 2024-08-05 ENCOUNTER — PATIENT MESSAGE (OUTPATIENT)
Dept: HEALTH INFORMATION MANAGEMENT | Facility: OTHER | Age: 64
End: 2024-08-05

## 2024-08-27 ENCOUNTER — APPOINTMENT (OUTPATIENT)
Dept: MEDICAL GROUP | Facility: LAB | Age: 64
End: 2024-08-27
Payer: COMMERCIAL

## 2024-11-23 ENCOUNTER — OFFICE VISIT (OUTPATIENT)
Dept: URGENT CARE | Facility: PHYSICIAN GROUP | Age: 64
End: 2024-11-23
Payer: COMMERCIAL

## 2024-11-23 VITALS
WEIGHT: 189 LBS | TEMPERATURE: 97.7 F | HEART RATE: 63 BPM | BODY MASS INDEX: 25.6 KG/M2 | SYSTOLIC BLOOD PRESSURE: 126 MMHG | OXYGEN SATURATION: 97 % | RESPIRATION RATE: 16 BRPM | HEIGHT: 72 IN | DIASTOLIC BLOOD PRESSURE: 78 MMHG

## 2024-11-23 DIAGNOSIS — J06.9 VIRAL URI: ICD-10-CM

## 2024-11-23 PROCEDURE — 3074F SYST BP LT 130 MM HG: CPT | Performed by: FAMILY MEDICINE

## 2024-11-23 PROCEDURE — 99213 OFFICE O/P EST LOW 20 MIN: CPT | Performed by: FAMILY MEDICINE

## 2024-11-23 PROCEDURE — 3078F DIAST BP <80 MM HG: CPT | Performed by: FAMILY MEDICINE

## 2024-11-23 ASSESSMENT — FIBROSIS 4 INDEX: FIB4 SCORE: 0.96

## 2024-11-23 NOTE — PROGRESS NOTES
CC:  cough        Cough  This is a new problem. The current episode started 7 days ago. The problem has been unchanged. The problem occurs constantly. The cough is dry    He denies: fatigue, muscle aches, fever. Pertinent negatives include no   headaches, nausea, vomiting, diarrhea, sweats, weight loss or wheezing. Nothing aggravates the symptoms.  Patient has tried nothing for the symptoms. There is no history of asthma.        Past Medical History:   Diagnosis Date    DVT, lower extremity (HCC)     LEFT side         Social History     Tobacco Use    Smoking status: Never    Smokeless tobacco: Never   Vaping Use    Vaping status: Never Used   Substance Use Topics    Alcohol use: Not Currently    Drug use: Never         Current Outpatient Medications on File Prior to Visit   Medication Sig Dispense Refill    rivaroxaban (XARELTO) 10 MG Tab tablet Take 1 Tablet by mouth with dinner. 100 Tablet 3    tamsulosin (FLOMAX) 0.4 MG capsule Take 1 Capsule by mouth 1/2 hour after breakfast. 30 Capsule 1     No current facility-administered medications on file prior to visit.                    Review of Systems   Constitutional: Negative for fever and weight loss.   HENT: negative for otalgia  Cardiovascular - denies chest pain or dyspnea  Respiratory: Positive for cough.  .  Negative for wheezing.    Neurological: Negative for headaches.   GI - denies nausea, vomiting or diarrhea  Neuro - denies numbness or tingling.            Objective:     /78 (BP Location: Right arm, Patient Position: Sitting, BP Cuff Size: Adult)   Pulse 63   Temp 36.5 °C (97.7 °F) (Temporal)   Resp 16   Ht 1.829 m (6')   Wt 85.7 kg (189 lb)   SpO2 97%     Physical Exam   Constitutional: patient is oriented to person, place, and time. Patient appears well-developed and well-nourished. No distress.   HENT:   Head: Normocephalic and atraumatic.   Right Ear: External ear normal.   Left Ear: External ear normal.   Nose: Mucosal edema  present.  Right sinus exhibits no maxillary sinus tenderness. Left sinus exhibits no maxillary sinus tenderness.   Mouth/Throat: Mucous membranes are normal. No oral lesions.  No posterior pharyngeal erythema.  No oropharyngeal exudate or posterior oropharyngeal edema.   Eyes: Conjunctivae and EOM are normal. Pupils are equal, round, and reactive to light. Right eye exhibits no discharge. Left eye exhibits no discharge. No scleral icterus.   Neck: Normal range of motion. Neck supple. No tracheal deviation present.   Cardiovascular: Normal rate, regular rhythm and normal heart sounds.  Exam reveals no friction rub.    Pulmonary/Chest: Effort normal. No respiratory distress. Patient has no wheezes or rhonchi. Patient has no rales.    Musculoskeletal:  exhibits no edema.   Lymphadenopathy:     Patient has no cervical adenopathy.      Neurological: patient is alert and oriented to person, place, and time.   Skin: Skin is warm and dry. No rash noted. No erythema.   Psychiatric: patient  has a normal mood and affect.  behavior is normal.   Nursing note and vitals reviewed.              Assessment/Plan:           1. Viral URI     No fever, I have no concern for pneumonia at this time.     Robatussin, prn    Differential diagnosis, natural history, supportive care, and indications for immediate follow-up discussed. All questions answered. Patient agrees with the plan of care.     Follow-up as needed if symptoms worsen or fail to improve to PCP, Urgent care or Emergency Room.     I have personally reviewed prior external notes and test results pertinent to today's visit.  I have independently reviewed and interpreted all diagnostics ordered during this urgent care acute visit.

## 2024-11-26 ENCOUNTER — OFFICE VISIT (OUTPATIENT)
Dept: MEDICAL GROUP | Facility: LAB | Age: 64
End: 2024-11-26
Payer: COMMERCIAL

## 2024-11-26 VITALS
RESPIRATION RATE: 12 BRPM | BODY MASS INDEX: 25.55 KG/M2 | HEIGHT: 72 IN | HEART RATE: 59 BPM | OXYGEN SATURATION: 96 % | TEMPERATURE: 97.4 F | DIASTOLIC BLOOD PRESSURE: 62 MMHG | WEIGHT: 188.6 LBS | SYSTOLIC BLOOD PRESSURE: 102 MMHG

## 2024-11-26 DIAGNOSIS — J98.9 RESPIRATORY ILLNESS: ICD-10-CM

## 2024-11-26 PROCEDURE — 99213 OFFICE O/P EST LOW 20 MIN: CPT | Performed by: FAMILY MEDICINE

## 2024-11-26 PROCEDURE — 3074F SYST BP LT 130 MM HG: CPT | Performed by: FAMILY MEDICINE

## 2024-11-26 PROCEDURE — 3078F DIAST BP <80 MM HG: CPT | Performed by: FAMILY MEDICINE

## 2024-11-26 ASSESSMENT — FIBROSIS 4 INDEX: FIB4 SCORE: 0.96

## 2024-11-26 NOTE — PROGRESS NOTES
Subjective:     CC: cough    HPI:   Reilly presents today with concern for continued cough.  Has had cough for 10 days.  Evaluated at urgent care 3 days ago and diagnosed with viral URI.  Cough not changing months, still worse at night.  Otherwise not feeling significantly ill.  No fevers, no trouble breathing.  Has mild sore throat, mild nausea.    Urgent care note from 11/23 reviewed.  Diagnosed with viral URI.    Current Outpatient Medications   Medication Sig Dispense Refill    rivaroxaban (XARELTO) 10 MG Tab tablet Take 1 Tablet by mouth with dinner. 100 Tablet 3    tamsulosin (FLOMAX) 0.4 MG capsule Take 1 Capsule by mouth 1/2 hour after breakfast. (Patient not taking: Reported on 11/26/2024) 30 Capsule 1     No current facility-administered medications for this visit.       Medications, past medical history, allergies, and social history have been reviewed and updated.      Objective:       Exam:  /62 (BP Location: Left arm, Patient Position: Sitting, BP Cuff Size: Adult)   Pulse (!) 59   Temp 36.3 °C (97.4 °F) (Temporal)   Resp 12   Ht 1.829 m (6')   Wt 85.5 kg (188 lb 9.6 oz)   SpO2 96%   BMI 25.58 kg/m²  Body mass index is 25.58 kg/m².    Constitutional: Alert. Well appearing. No distress.  Skin: Warm, dry, good turgor, no visible rashes.  ENMT: Moist mucous membranes. Normal dentition. Oropharynx is clear.  TMs are partially obscured by cerumen but visible portion is clear.  No cervical LAD.  Respiratory: Normal effort. Lungs are clear to auscultation bilaterally.    Cardiovascular: Regular rate and rhythm. Normal S1/S2. No murmurs, rubs or gallops.   Neuro: Moves all four extremities. No facial droop.  Psych: Answers questions appropriately. Normal affect and mood.    Assessment & Plan:     64 y.o. male with the following -     1. Respiratory illness  Symptoms consistent with viral respiratory illness with continued cough for 10 days.  Lungs are clear.  Discussed likely prognosis and that  cough can continue with viral respiratory illness and did not necessarily need further workup or evaluation unless other symptoms worsening.  Will plan for CXR if he is still not seeing improvement after a few more days.  Otherwise continue with OTC cough/cold medication.  - DX-CHEST-2 VIEWS; Future      Please note that this note was created using voice recognition software.

## 2024-11-27 ENCOUNTER — HOSPITAL ENCOUNTER (OUTPATIENT)
Dept: RADIOLOGY | Facility: MEDICAL CENTER | Age: 64
End: 2024-11-27
Attending: FAMILY MEDICINE
Payer: COMMERCIAL

## 2024-11-27 DIAGNOSIS — J98.9 RESPIRATORY ILLNESS: ICD-10-CM

## 2024-11-27 PROCEDURE — 71046 X-RAY EXAM CHEST 2 VIEWS: CPT

## 2024-12-04 ENCOUNTER — APPOINTMENT (OUTPATIENT)
Dept: MEDICAL GROUP | Facility: LAB | Age: 64
End: 2024-12-04
Payer: COMMERCIAL

## 2025-01-13 SDOH — ECONOMIC STABILITY: TRANSPORTATION INSECURITY
IN THE PAST 12 MONTHS, HAS LACK OF TRANSPORTATION KEPT YOU FROM MEETINGS, WORK, OR FROM GETTING THINGS NEEDED FOR DAILY LIVING?: PATIENT DECLINED

## 2025-01-13 SDOH — HEALTH STABILITY: PHYSICAL HEALTH: ON AVERAGE, HOW MANY MINUTES DO YOU ENGAGE IN EXERCISE AT THIS LEVEL?: PATIENT DECLINED

## 2025-01-13 SDOH — ECONOMIC STABILITY: TRANSPORTATION INSECURITY
IN THE PAST 12 MONTHS, HAS THE LACK OF TRANSPORTATION KEPT YOU FROM MEDICAL APPOINTMENTS OR FROM GETTING MEDICATIONS?: PATIENT DECLINED

## 2025-01-13 SDOH — HEALTH STABILITY: MENTAL HEALTH
STRESS IS WHEN SOMEONE FEELS TENSE, NERVOUS, ANXIOUS, OR CAN'T SLEEP AT NIGHT BECAUSE THEIR MIND IS TROUBLED. HOW STRESSED ARE YOU?: PATIENT DECLINED

## 2025-01-13 SDOH — HEALTH STABILITY: PHYSICAL HEALTH
ON AVERAGE, HOW MANY DAYS PER WEEK DO YOU ENGAGE IN MODERATE TO STRENUOUS EXERCISE (LIKE A BRISK WALK)?: PATIENT DECLINED

## 2025-01-13 SDOH — ECONOMIC STABILITY: FOOD INSECURITY: WITHIN THE PAST 12 MONTHS, THE FOOD YOU BOUGHT JUST DIDN'T LAST AND YOU DIDN'T HAVE MONEY TO GET MORE.: PATIENT DECLINED

## 2025-01-13 SDOH — ECONOMIC STABILITY: INCOME INSECURITY: HOW HARD IS IT FOR YOU TO PAY FOR THE VERY BASICS LIKE FOOD, HOUSING, MEDICAL CARE, AND HEATING?: PATIENT DECLINED

## 2025-01-13 SDOH — ECONOMIC STABILITY: HOUSING INSECURITY
IN THE LAST 12 MONTHS, WAS THERE A TIME WHEN YOU DID NOT HAVE A STEADY PLACE TO SLEEP OR SLEPT IN A SHELTER (INCLUDING NOW)?: PATIENT DECLINED

## 2025-01-13 SDOH — ECONOMIC STABILITY: FOOD INSECURITY: WITHIN THE PAST 12 MONTHS, YOU WORRIED THAT YOUR FOOD WOULD RUN OUT BEFORE YOU GOT MONEY TO BUY MORE.: PATIENT DECLINED

## 2025-01-13 SDOH — ECONOMIC STABILITY: INCOME INSECURITY: IN THE LAST 12 MONTHS, WAS THERE A TIME WHEN YOU WERE NOT ABLE TO PAY THE MORTGAGE OR RENT ON TIME?: PATIENT DECLINED

## 2025-01-13 SDOH — ECONOMIC STABILITY: TRANSPORTATION INSECURITY
IN THE PAST 12 MONTHS, HAS LACK OF RELIABLE TRANSPORTATION KEPT YOU FROM MEDICAL APPOINTMENTS, MEETINGS, WORK OR FROM GETTING THINGS NEEDED FOR DAILY LIVING?: PATIENT DECLINED

## 2025-01-13 ASSESSMENT — LIFESTYLE VARIABLES
HOW OFTEN DO YOU HAVE A DRINK CONTAINING ALCOHOL: PATIENT DECLINED
HOW OFTEN DO YOU HAVE SIX OR MORE DRINKS ON ONE OCCASION: PATIENT DECLINED
AUDIT-C TOTAL SCORE: -1
SKIP TO QUESTIONS 9-10: 0
HOW MANY STANDARD DRINKS CONTAINING ALCOHOL DO YOU HAVE ON A TYPICAL DAY: PATIENT DECLINED

## 2025-01-13 ASSESSMENT — SOCIAL DETERMINANTS OF HEALTH (SDOH)
WITHIN THE PAST 12 MONTHS, YOU WORRIED THAT YOUR FOOD WOULD RUN OUT BEFORE YOU GOT THE MONEY TO BUY MORE: PATIENT DECLINED
HOW OFTEN DO YOU GET TOGETHER WITH FRIENDS OR RELATIVES?: PATIENT DECLINED
IN A TYPICAL WEEK, HOW MANY TIMES DO YOU TALK ON THE PHONE WITH FAMILY, FRIENDS, OR NEIGHBORS?: PATIENT DECLINED
IN A TYPICAL WEEK, HOW MANY TIMES DO YOU TALK ON THE PHONE WITH FAMILY, FRIENDS, OR NEIGHBORS?: PATIENT DECLINED
HOW OFTEN DO YOU ATTEND CHURCH OR RELIGIOUS SERVICES?: PATIENT DECLINED
ARE YOU MARRIED, WIDOWED, DIVORCED, SEPARATED, NEVER MARRIED, OR LIVING WITH A PARTNER?: PATIENT DECLINED
HOW OFTEN DO YOU ATTENT MEETINGS OF THE CLUB OR ORGANIZATION YOU BELONG TO?: PATIENT DECLINED
HOW OFTEN DO YOU ATTEND CHURCH OR RELIGIOUS SERVICES?: PATIENT DECLINED
ARE YOU MARRIED, WIDOWED, DIVORCED, SEPARATED, NEVER MARRIED, OR LIVING WITH A PARTNER?: PATIENT DECLINED
IN THE PAST 12 MONTHS, HAS THE ELECTRIC, GAS, OIL, OR WATER COMPANY THREATENED TO SHUT OFF SERVICE IN YOUR HOME?: PATIENT DECLINED
HOW HARD IS IT FOR YOU TO PAY FOR THE VERY BASICS LIKE FOOD, HOUSING, MEDICAL CARE, AND HEATING?: PATIENT DECLINED
HOW MANY DRINKS CONTAINING ALCOHOL DO YOU HAVE ON A TYPICAL DAY WHEN YOU ARE DRINKING: PATIENT DECLINED
DO YOU BELONG TO ANY CLUBS OR ORGANIZATIONS SUCH AS CHURCH GROUPS UNIONS, FRATERNAL OR ATHLETIC GROUPS, OR SCHOOL GROUPS?: PATIENT DECLINED
HOW OFTEN DO YOU HAVE A DRINK CONTAINING ALCOHOL: PATIENT DECLINED
HOW OFTEN DO YOU HAVE SIX OR MORE DRINKS ON ONE OCCASION: PATIENT DECLINED
DO YOU BELONG TO ANY CLUBS OR ORGANIZATIONS SUCH AS CHURCH GROUPS UNIONS, FRATERNAL OR ATHLETIC GROUPS, OR SCHOOL GROUPS?: PATIENT DECLINED
HOW OFTEN DO YOU ATTENT MEETINGS OF THE CLUB OR ORGANIZATION YOU BELONG TO?: PATIENT DECLINED
HOW OFTEN DO YOU GET TOGETHER WITH FRIENDS OR RELATIVES?: PATIENT DECLINED

## 2025-01-14 ENCOUNTER — OFFICE VISIT (OUTPATIENT)
Dept: SPORTS MEDICINE | Facility: OTHER | Age: 65
End: 2025-01-14
Payer: COMMERCIAL

## 2025-01-14 ENCOUNTER — APPOINTMENT (OUTPATIENT)
Dept: RADIOLOGY | Facility: OTHER | Age: 65
End: 2025-01-14
Attending: FAMILY MEDICINE
Payer: COMMERCIAL

## 2025-01-14 VITALS
TEMPERATURE: 97.2 F | OXYGEN SATURATION: 97 % | HEART RATE: 63 BPM | SYSTOLIC BLOOD PRESSURE: 110 MMHG | HEIGHT: 72 IN | DIASTOLIC BLOOD PRESSURE: 68 MMHG | RESPIRATION RATE: 16 BRPM | BODY MASS INDEX: 25.55 KG/M2 | WEIGHT: 188.6 LBS

## 2025-01-14 DIAGNOSIS — M25.562 ACUTE PAIN OF LEFT KNEE: ICD-10-CM

## 2025-01-14 PROCEDURE — 3074F SYST BP LT 130 MM HG: CPT | Performed by: FAMILY MEDICINE

## 2025-01-14 PROCEDURE — 73564 X-RAY EXAM KNEE 4 OR MORE: CPT | Mod: TC,FY,LT | Performed by: FAMILY MEDICINE

## 2025-01-14 PROCEDURE — 99214 OFFICE O/P EST MOD 30 MIN: CPT | Performed by: FAMILY MEDICINE

## 2025-01-14 PROCEDURE — 3078F DIAST BP <80 MM HG: CPT | Performed by: FAMILY MEDICINE

## 2025-01-14 RX ORDER — OFLOXACIN 3 MG/ML
SOLUTION AURICULAR (OTIC)
COMMUNITY
Start: 2024-12-31

## 2025-01-14 ASSESSMENT — FIBROSIS 4 INDEX: FIB4 SCORE: 0.96

## 2025-01-14 NOTE — PROGRESS NOTES
Chief Complaint   Patient presents with    Knee Pain     L knee pain      CHIEF COMPLAINT:  Reilly Swartz male presenting at the request of Self-Referred for evaluation of knee pain.     Reilly Swartz is complaining of left knee pain  Present on and off for 1 yr  Worse after running on treadmill  Pain is at the lateral knee  Quality is sharp  Pain is non-radiating   Improved with resting  Aggravated by treadmill running  no prior problems with this area in the past   Prior Treatments:  none   Prior studies: NO Prior imaging has been done   Medications tried for pain include: none  Mechanical Symptom history: No Locking    , family law in [place]   Like running, stationary cycling   Running, treadmill    REVIEW OF SYSTEMS  No Nausea, No Vomiting, No Chest Pain, No Shortness of Breath, No Dizziness, No Headache    PAST MEDICAL HISTORY:   History reviewed. No pertinent past medical history.    PMH:  has a past medical history of DVT, lower extremity (HCC).  MEDS:   Current Outpatient Medications:     ofloxacin otic sol (FLOXIN OTIC) 0.3 % Solution, INSTILL 5 DROPS IN BOTH EARS DAILY FOR 5 DAYS, Disp: , Rfl:     rivaroxaban (XARELTO) 10 MG Tab tablet, Take 1 Tablet by mouth with dinner., Disp: 100 Tablet, Rfl: 3  ALLERGIES: No Known Allergies  SURGHX:   Past Surgical History:   Procedure Laterality Date    OTHER ABDOMINAL SURGERY      inguinal hernia repair 2008     SOCHX:  reports that he has never smoked. He has never used smokeless tobacco. He reports that he does not currently use alcohol. He reports that he does not use drugs.  FH: Family history was reviewed, no pertinent findings to report     PHYSICAL EXAM:  /68 (BP Location: Left arm, Patient Position: Sitting, BP Cuff Size: Adult)   Pulse 63   Temp 36.2 °C (97.2 °F) (Temporal)   Resp 16   Ht 1.829 m (6')   Wt 85.5 kg (188 lb 9.6 oz)   SpO2 97%   BMI 25.58 kg/m²      well-developed, well-nourished in no apparent distress, alert and  oriented x 3.  Gait: normal     RIGHT Knee:  Slight Varus and No Swelling  Range of Motion Intact  Trace effusion  Patellar No tenderness and no apprehension  Medial Joint Line Non-tender and NEGATIVE Andree  Lateral Joint Line Non-tender and NEGATIVE Andree  Trace Laxity with Varus stress  Trace Laxity with Valgus stress  Lachman's testing is Trace  Posterior Drawer Testing is Trace  The leg is otherwise neurovascularly intact    LEFT Knee:  Slight Varus and No Swelling   Range of Motion moderately limited with flexion  Trace effusion  Patellar No tenderness and no apprehension  Medial Joint Line Non-tender and NEGATIVE Andree  Lateral Joint Line Non-tender and NEGATIVE Andree  Trace Laxity with Varus stress  Trace Laxity with Valgus stress, he is tender along the LCL  Lachman's testing is Trace  Posterior Drawer Testing is Trace  The leg is otherwise neurovascularly intact    Additional Findings: None      1. Acute pain of left knee (LATERAL)  DX-KNEE COMPLETE 4+ LEFT        Present on and off for 1 yr  Worse after running on treadmill  Pain is at the lateral knee    Suspect lateral distal ITB versus lateral meniscus versus LCL  No specific injury  He does have some mild OA on x-ray    He was provided with home exercise program for knee  He was advised to discontinue treadmill and try stationary cycling at low resistance    Return in about 3 weeks (around 2/4/2025).  To see how he is doing with home exercise program and transitioning to stationary cycle instead of running on the treadmill          1/14/2025 4:06 PM     HISTORY/REASON FOR EXAM:  pain at lateral knee for 1 yr        TECHNIQUE/EXAM DESCRIPTION AND NUMBER OF VIEWS:  4 views of the LEFT knee.     COMPARISON: None     FINDINGS:  No acute fracture or dislocation.     No joint osteoarthritis     No knee joint effusion.     IMPRESSION:        1. No acute osseous abnormality.        Exam Ended: 01/14/25  4:07 PM Last Resulted: 01/14/25  4:16 PM               Reading Physician Reading Date Result Priority   Livan Haynes M.D. 10/28/2021 Routine     Narrative & Impression     10/28/2021 8:38 AM     HISTORY/REASON FOR EXAM:  Atraumatic medial right knee pain with intermittent swelling.        TECHNIQUE/EXAM DESCRIPTION AND NUMBER OF VIEWS:  4 views of the RIGHT knee.     COMPARISON: None     FINDINGS:     The alignment of the knee is within normal limits.  There is no joint effusion.  There is no evidence of displaced fracture or dislocation.  No degenerative or erosive arthritic change is present.  There is minor spurring of the anterior tibial tubercle.  There is no focal swelling.        IMPRESSION:     No right knee fracture, dislocation, or joint effusion.        Exam Ended: 10/28/21  8:54 AM Last Resulted: 10/28/21  1:00 PM     Interpreted in the office today with the patient    Self-Referred